# Patient Record
Sex: MALE | Race: BLACK OR AFRICAN AMERICAN | NOT HISPANIC OR LATINO | Employment: FULL TIME | URBAN - METROPOLITAN AREA
[De-identification: names, ages, dates, MRNs, and addresses within clinical notes are randomized per-mention and may not be internally consistent; named-entity substitution may affect disease eponyms.]

---

## 2018-07-24 ENCOUNTER — PRE VISIT (OUTPATIENT)
Dept: UROLOGY | Facility: CLINIC | Age: 43
End: 2018-07-24

## 2018-07-24 NOTE — TELEPHONE ENCOUNTER
MEDICAL RECORDS REQUEST   [facility]  Urology Clinic  [address] [place] [address]  PHONE: [phone]  Fax: [phone]        FUTURE VISIT INFORMATION                                                   Mildred Quintana : 1975 scheduled for future visit at [facility]    APPOINTMENT INFORMATION:    Date: 2018    Provider:  John cr    Reason for Visit/Diagnosis: Penile sensitivity    REFERRAL INFORMATION:    Referring provider:  self    Specialty: self    Referring providers clinic:  self    Clinic contact number:  self    RECORDS REQUESTED FOR VISIT                                                     NOTES  STATUS/DETAILS   OFFICE NOTE from referring provider  no   OFFICE NOTE from other specialist  no   DISCHARGE SUMMARY from hospital  no   DISCHARGE REPORT from the ER  no   OPERATIVE REPORT  no   MEDICATION LIST  no       PRE-VISIT CHECKLIST      Record collection complete If no, please explain in process   Appointment appropriately scheduled           (right time/right provider) Yes   MyChart activation If no, please explain in process   Questionnaire complete If no, please explain in process     Completed by: Shannon Jones

## 2018-08-16 ENCOUNTER — PRE VISIT (OUTPATIENT)
Dept: UROLOGY | Facility: CLINIC | Age: 43
End: 2018-08-16

## 2018-08-16 NOTE — TELEPHONE ENCOUNTER
Patient with history of penile sensitivity coming in for consult with Dr. Braxton. Patient chart reviewed, no need for call, all records available and ready for appointment.

## 2018-08-23 ENCOUNTER — OFFICE VISIT (OUTPATIENT)
Dept: UROLOGY | Facility: CLINIC | Age: 43
End: 2018-08-23
Payer: COMMERCIAL

## 2018-08-23 ENCOUNTER — APPOINTMENT (OUTPATIENT)
Dept: LAB | Facility: CLINIC | Age: 43
End: 2018-08-23
Payer: COMMERCIAL

## 2018-08-23 ENCOUNTER — RADIANT APPOINTMENT (OUTPATIENT)
Dept: ULTRASOUND IMAGING | Facility: CLINIC | Age: 43
End: 2018-08-23
Attending: UROLOGY
Payer: COMMERCIAL

## 2018-08-23 VITALS — DIASTOLIC BLOOD PRESSURE: 80 MMHG | HEART RATE: 73 BPM | SYSTOLIC BLOOD PRESSURE: 126 MMHG

## 2018-08-23 DIAGNOSIS — N48.6 PEYRONIE'S DISEASE: Primary | ICD-10-CM

## 2018-08-23 DIAGNOSIS — N48.89 PENILE PAIN: ICD-10-CM

## 2018-08-23 DIAGNOSIS — R10.9 FLANK PAIN: ICD-10-CM

## 2018-08-23 LAB
ALBUMIN UR-MCNC: NEGATIVE MG/DL
APPEARANCE UR: CLEAR
BILIRUB UR QL STRIP: NEGATIVE
COLOR UR AUTO: YELLOW
GLUCOSE UR STRIP-MCNC: NEGATIVE MG/DL
HGB UR QL STRIP: NEGATIVE
KETONES UR STRIP-MCNC: NEGATIVE MG/DL
LEUKOCYTE ESTERASE UR QL STRIP: NEGATIVE
MUCOUS THREADS #/AREA URNS LPF: PRESENT /LPF
NITRATE UR QL: NEGATIVE
PH UR STRIP: 6 PH (ref 5–7)
RBC #/AREA URNS AUTO: <1 /HPF (ref 0–2)
SOURCE: ABNORMAL
SP GR UR STRIP: 1.01 (ref 1–1.03)
UROBILINOGEN UR STRIP-MCNC: 0 MG/DL (ref 0–2)
WBC #/AREA URNS AUTO: <1 /HPF (ref 0–5)

## 2018-08-23 ASSESSMENT — ENCOUNTER SYMPTOMS
DIFFICULTY URINATING: 0
HEMATURIA: 0
DYSURIA: 0
FLANK PAIN: 1

## 2018-08-23 ASSESSMENT — PAIN SCALES - GENERAL: PAINLEVEL: MILD PAIN (2)

## 2018-08-23 NOTE — PROGRESS NOTES
I am seeing Mildred Quintana in consultation for evaluation of penile sensitivity.  New patient, self referred..    HPI:  Mildred Quintana is a 43 year old male with sexual concerns.  Moved to [place] from [place] about 6 months ago.  Met a new GF in January.  Had an episode of vigorous intercourse were he had some discomfort.  He's had some lingering pain, at the time, discomfort resolved, but now noticed some vague discomfort that recurred around May 2018.  He's tried different underwear.      REVIEW OF SYSTEMS:  General: negative  Skin: negative  Eyes: negative  Ears/Nose/Throat: negative  Respiratory: negative  Cardiovascular: negative  Gastrointestinal: negative  Genitourinary: see HPI  Musculoskeletal: he's noted a little back ache at time if not hydrating well.  No history of kidney stones.  Neurologic: negative  Psychiatric: negative  Hematologic/Lymphatic/Immunologic: negative  Endocrine: negative    PAST MEDICAL HX:  No chronic medical problems     PAST SURG HX:  No history of surgery.    FAMILY HX:  No family history on file.    SOCIAL HX:  Social History   Substance Use Topics     Smoking status: Not on file     Smokeless tobacco: Not on file     Alcohol use Not on file       MEDICATIONS:  No prescription medications     ALLERGIES:  Review of patient's allergies indicates not on file.    GENERAL PHYSICAL EXAM:   /80  Pulse 73   Constitutional: No acute distress. Well nourished.   PSYCH: normal mood and affect.  NEURO: normal gait, no focal deficits.   EYES: anicteric, EOMI, PERR.  CARDIOPULMONARY: breathing non-labored, pulse regular rate/rhythm, no peripheral edema.  GI: Abdomen soft, non-tender, no surgical scars, no organomegaly.  MUSCULOSKELETAL: normal limb proportions, no muscle wasting, no contractures.  SKIN: Normal virilized hair distribution, no lesions, warts or rashes over genitalia, abdomen extremities or face.  HEME/LYMPH: no ecchymosis, no lymphadenopathy in groin or neck, no  lymphedema.     EXAM:  Phallus  circumcised, meatus adequate, diffuse distal plaques palpated at the distal tips  Left testis descended , size is normal  , consistency is normal . No intra-testicular masses.   Right testis descended , size is normal  , consistency is normal . No intra-testicular masses.   Epididymes present, non-tender, not-enlarged.   Left cord: Vas present.   Right cord: Vas present.    Prostate exam: 30g, anodular, nontender.    Imaging/labs:  none    ASSESSMENT:     Penile discomfort- unclear etiology.  May be sequela of mild Peyronie's disease.  Pain is on his right which is opposite his left lateral curvature ( which is not new).        Occasionally has right flank discomfort 3/10 when not hydrated well.    PLAN:    LAURE today was  normal     UAIC ordered    Observe mild Peyronie's disease which does not sound active.    Renal ultrasound to screen for renal hydronephrosis given flank pain at times.      John Braxton MD     Urological Surgeon

## 2018-08-23 NOTE — LETTER
8/23/2018       RE: Mildred Quintana  [address]  [facility] [address]     Dear Colleague,    Thank you for referring your patient, Mildred Quintana, to the [facility] UROLOGY AND INST FOR PROSTATE AND UROLOGIC CANCERS at [facility]. Please see a copy of my visit note below.    I am seeing Mildred Quintana in consultation for evaluation of penile sensitivity.  New patient, self referred..    HPI:  Mildred Quintana is a 43 year old male with sexual concerns.  Moved to [place] from [place] about 6 months ago.  Met a new GF in January.  Had an episode of vigorous intercourse were he had some discomfort.  He's had some lingering pain, at the time, discomfort resolved, but now noticed some vague discomfort that recurred around May 2018.  He's tried different underwear.    PAST MEDICAL HX:  No chronic medical problems     PAST SURG HX:  No history of surgery.    FAMILY HX:  No family history on file.    SOCIAL HX:  Social History   Substance Use Topics     Smoking status: Not on file     Smokeless tobacco: Not on file     Alcohol use Not on file       MEDICATIONS:  No prescription medications     ALLERGIES:  Review of patient's allergies indicates not on file.    GENERAL PHYSICAL EXAM:   /80  Pulse 73   Constitutional: No acute distress. Well nourished.   PSYCH: normal mood and affect.  NEURO: normal gait, no focal deficits.   EYES: anicteric, EOMI, PERR.  CARDIOPULMONARY: breathing non-labored, pulse regular rate/rhythm, no peripheral edema.  GI: Abdomen soft, non-tender, no surgical scars, no organomegaly.  MUSCULOSKELETAL: normal limb proportions, no muscle wasting, no contractures.  SKIN: Normal virilized hair distribution, no lesions, warts or rashes over genitalia, abdomen extremities or face.  HEME/LYMPH: no ecchymosis, no lymphadenopathy in groin or neck, no lymphedema.     EXAM:  Phallus  circumcised, meatus adequate, diffuse distal plaques palpated at the distal  tips  Left testis descended , size is normal  , consistency is normal . No intra-testicular masses.   Right testis descended , size is normal  , consistency is normal . No intra-testicular masses.   Epididymes present, non-tender, not-enlarged.   Left cord: Vas present.   Right cord: Vas present.    Prostate exam: 30g, anodular, nontender.    Imaging/labs:  none    ASSESSMENT:     Penile discomfort- unclear etiology.  May be sequela of mild Peyronie's disease.  Pain is on his right which is opposite his left lateral curvature ( which is not new).        Occasionally has right flank discomfort 3/10 when not hydrated well.    PLAN:    LAURE today was  normal     UAIC ordered    Observe mild Peyronie's disease which does not sound active.    Renal ultrasound to screen for renal hydronephrosis given flank pain at times.      John Braxton MD     Urological Surgeon

## 2018-08-23 NOTE — PROGRESS NOTES
Dear Mildred,   Here are your recent results.     Kidneys are normal.  No concerns.  Thank You      John Braxton MD

## 2018-08-23 NOTE — PATIENT INSTRUCTIONS
Schedule a lab appointment for a urine test and a renal US.    It was a pleasure meeting with you today.  Thank you for allowing me and my team the privilege of caring for you today.  YOU are the reason we are here, and I truly hope we provided you with the excellent service you deserve.  Please let us know if there is anything else we can do for you so that we can be sure you are leaving completely satisfied with your care experience.

## 2018-08-23 NOTE — MR AVS SNAPSHOT
After Visit Summary   8/23/2018    Mildred Quintana    MRN: [id number]           Patient Information     Date Of Birth          1975        Visit Information        Provider Department      8/23/2018 8:20 AM John Braxton MD [facility] Urology and [facility]rologic C[facility]        Today's Diagnoses     Peyronie's disease    -  1    Penile pain        Flank pain          Care Instructions    Schedule a lab appointment for a urine test and a renal US.    It was a pleasure meeting with you today.  Thank you for allowing me and my team the privilege of caring for you today.  YOU are the reason we are here, and I truly hope we provided you with the excellent service you deserve.  Please let us know if there is anything else we can do for you so that we can be sure you are leaving completely satisfied with your care experience.                Follow-ups after your visit        Your next 10 appointments already scheduled     Aug 23, 2018  9:30 AM CDT   LAB with [facility] ([facility])    [address] [place] [id number] [phone]           Please do not eat 10-12 hours before your appointment if you are coming in fasting for labs on lipids, cholesterol, or glucose (sugar). This does not apply to pregnant women. Water, hot tea and black coffee (with nothing added) are okay. Do not drink other fluids, diet soda or chew gum.            Sep 01, 2018  8:00 AM CDT   US RENAL COMPLETE with UCUS3   [facility] US ([facility])    [address] [place] [id number] [phone]           Please bring a list of your medicines (including vitamins, minerals and over-the-counter drugs). Also, tell your doctor about any allergies you may have. Wear comfortable clothes and leave your valuables at home.  You do not need to do anything special to prepare for your exam.  Please  call the Imaging Department at your exam site with any questions.              Future tests that were ordered for you today     Open Future Orders        Priority Expected Expires Ordered    US Renal Complete Routine  8/23/2019 8/23/2018            Who to contact     Please call your clinic at [phone] to:    Ask questions about your health    Make or cancel appointments    Discuss your medicines    Learn about your test results    Speak to your doctor            Additional Information About Your Visit        [employer] Information     [employer] gives you secure access to your electronic health record. If you see a primary care provider, you can also send messages to your care team and make appointments. If you have questions, please call your primary care clinic.  If you do not have a primary care provider, please call [phone] and they will assist you.      [employer] is an electronic gateway that provides easy, online access to your medical records. With [employer], you can request a clinic appointment, read your test results, renew a prescription or communicate with your care team.     To access your existing account, please contact your [facility] or call [phone] for assistance.        Care EveryWhere ID     This is your Care EveryWhere ID. This could be used by other organizations to access your [place] medical records  [id number]        Your Vitals Were     Pulse                   73            Blood Pressure from Last 3 Encounters:   08/23/18 126/80    Weight from Last 3 Encounters:   No data found for Wt              We Performed the Following     UA with Microscopic reflex to Culture        Primary Care Provider Fax #    Physician No Ref-Primary [phone]       No address on file        Equal Access to Services     HARMAN PÉREZ : Adeline Davey, kirill lal, brittany nuñez. So [facility]  [phone].    ATENCIÓN: Si habla neyda, tiene a villagomez disposición servicios gratuitos de asistencia lingüística. Llbassam al [phone].    We comply with applicable federal civil rights laws and [place] laws. We do not discriminate on the basis of race, color, national origin, age, disability, sex, sexual orientation, or gender identity.            Thank you!     Thank you for choosing [facility] UROLOGY AND [facility] FOR PROSTATE AND UROLOGIC CANCERS  for your care. Our goal is always to provide you with excellent care. Hearing back from our patients is one way we can continue to improve our services. Please take a few minutes to complete the written survey that you may receive in the mail after your visit with us. Thank you!             Your Updated Medication List - Protect others around you: Learn how to safely use, store and throw away your medicines at www.disposemymeds.org.      Notice  As of 8/23/2018  9:27 AM    You have not been prescribed any medications.

## 2018-11-27 ENCOUNTER — PRE VISIT (OUTPATIENT)
Dept: UROLOGY | Facility: CLINIC | Age: 43
End: 2018-11-27

## 2018-11-27 NOTE — TELEPHONE ENCOUNTER
Patient with history of penile lump coming in for consult with Dr. Braxton. Patient chart reviewed, no need for call, all records available and ready for appointment.

## 2018-12-06 ENCOUNTER — OFFICE VISIT (OUTPATIENT)
Dept: UROLOGY | Facility: CLINIC | Age: 43
End: 2018-12-06
Payer: COMMERCIAL

## 2018-12-06 VITALS — SYSTOLIC BLOOD PRESSURE: 118 MMHG | DIASTOLIC BLOOD PRESSURE: 75 MMHG | HEIGHT: 69 IN | HEART RATE: 65 BPM

## 2018-12-06 DIAGNOSIS — N48.9 PENILE LESION: Primary | ICD-10-CM

## 2018-12-06 ASSESSMENT — PAIN SCALES - GENERAL: PAINLEVEL: NO PAIN (0)

## 2018-12-06 NOTE — PROGRESS NOTES
"Urology Clinic Note      Date: 12/6/2018  Time: 9:54 AM  Patient: Mildred Quintana  MRN: [id number]    Reason for Visit: Penile lesion    HPI/Subjective: Mildred Quintana is a 43 year old male who presents for evaluation of a penile nodule    He was last seen in 08/2018 for evaluation of penile pain after vigorous intercourse. This was thought to be likely due to Peyronie's as there were distal plaques on exam. UA and NICOLA were obtained as he was endorsing intermittent flank pain, both were normal.     Pt noting a soft penile nodule on penis shaft- he wanted to get checked out. - this just looks like a slight irregularity in his circumcision line.    Scrotal ultrasound showed BPH.  Pt has minimal lower urinary tract symptoms, but we want to check a PVR to make sure he voids to completion.    Objective:  /75  Pulse 65  Ht 1.753 m (5' 9\")  Gen: In NAD, conversant.  Resp: Breathing non-labored  CV: Extremities warm.  Abd: Soft, non-distended, non-tender.  Phallus circumcised  Testes 18-20cc, no varicoceles noted today.    Assessment & Plan: Mildred Quintana is a 43 year old male for evaluation of a penile nodule.     PVR today = 11cc  Normal looking penile skin  Has BPH but advised observation.    15min visit, over 50% face to face in counseling/discussion of penile lesion and lower urinary tract symptoms.    I saw and examined the patient with the resident today.  I agree with the resident note and plan of care as above.     John Braxton MD  Urology Staff   "

## 2018-12-06 NOTE — MR AVS SNAPSHOT
"              After Visit Summary   12/6/2018    Mildred Quintana    MRN: [id number]           Patient Information     Date Of Birth          1975        Visit Information        Provider Department      12/6/2018 10:00 AM John Braxton MD [facility] Urology and [facility] for Prostate and Urologic Cancers        Today's Diagnoses     Penile lesion    -  1       Follow-ups after your visit        Follow-up notes from your care team     Return if symptoms worsen or fail to improve.      Who to contact     Please call your clinic at [phone] to:    Ask questions about your health    Make or cancel appointments    Discuss your medicines    Learn about your test results    Speak to your doctor            Additional Information About Your Visit        [facility] Information     [employer] gives you secure access to your electronic health record. If you see a primary care provider, you can also send messages to your care team and make appointments. If you have questions, please call your primary care clinic.  If you do not have a primary care provider, please call [phone] and they will assist you.      [employer] is an electronic gateway that provides easy, online access to your medical records. With [employer], you can request a clinic appointment, read your test results, renew a prescription or communicate with your care team.     To access your existing account, please contact your [facility] or call [phone] for assistance.        Care EveryWhere ID     This is your Care EveryWhere ID. This could be used by other organizations to access your [place] medical records  [id number]        Your Vitals Were     Pulse Height                65 1.753 m (5' 9\")           Blood Pressure from Last 3 Encounters:   12/06/18 118/75   08/23/18 126/80    Weight from Last 3 Encounters:   No data found for Wt              We Performed the Following     POST-VOID RESIDUAL BLADDER SCAN        Primary " Care Provider Fax #    Physician No Ref-Primary [phone]       No address on file        Equal Access to Services     SESARHOLLEY ELISA : Hadii aad ku hadestrella Davey, kirill nealpromise, matt cuellar, brittany mikkiin hayaapadmaja elier mena labrendapadmaja johnson. So [facility] [phone].    ATENCIÓN: Si habla español, tiene a villagomez disposición servicios gratuitos de asistencia lingüística. Llame al [phone].    We comply with applicable federal civil rights laws and [place] laws. We do not discriminate on the basis of race, color, national origin, age, disability, sex, sexual orientation, or gender identity.            Thank you!     Thank you for choosing [facility] UROLOGY AND [facility] FOR PROSTATE AND UROLOGIC CANCERS  for your care. Our goal is always to provide you with excellent care. Hearing back from our patients is one way we can continue to improve our services. Please take a few minutes to complete the written survey that you may receive in the mail after your visit with us. Thank you!             Your Updated Medication List - Protect others around you: Learn how to safely use, store and throw away your medicines at www.disposemymeds.org.      Notice  As of 12/6/2018  1:50 PM    You have not been prescribed any medications.

## 2018-12-06 NOTE — NURSING NOTE
"Chief Complaint   Patient presents with     Consult     history of penile lump coming in for consult with Dr. Braxton       Blood pressure 118/75, pulse 65, height 1.753 m (5' 9\"). There is no height or weight on file to calculate BMI.    There is no problem list on file for this patient.      No Known Allergies    No current outpatient prescriptions on file.       Social History   Substance Use Topics     Smoking status: Former Smoker     Smokeless tobacco: Never Used     Alcohol use Not on file       Olena Mackay LPN  12/6/2018  10:48 AM    "

## 2018-12-06 NOTE — LETTER
"12/6/2018       RE: Mildred Quintana  [address]  [facility] [address]     Dear Colleague,    Thank you for referring your patient, Mildred Quintana, to the [facility] UROLOGY AND INST FOR PROSTATE AND UROLOGIC CANCERS at [facility]. Please see a copy of my visit note below.    Urology Clinic Note      Date: 12/6/2018  Time: 9:54 AM  Patient: Mildred Quintana  MRN: [id number]    Reason for Visit: Penile lesion    HPI/Subjective: Mildred Quintana is a 43 year old male who presents for evaluation of a penile nodule    He was last seen in 08/2018 for evaluation of penile pain after vigorous intercourse. This was thought to be likely due to Peyronie's as there were distal plaques on exam. UA and NICOLA were obtained as he was endorsing intermittent flank pain, both were normal.     Pt noting a soft penile nodule on penis shaft- he wanted to get checked out. - this just looks like a slight irregularity in his circumcision line.    Scrotal ultrasound showed BPH.  Pt has minimal lower urinary tract symptoms, but we want to check a PVR to make sure he voids to completion.    Objective:  /75  Pulse 65  Ht 1.753 m (5' 9\")  Gen: In NAD, conversant.  Resp: Breathing non-labored  CV: Extremities warm.  Abd: Soft, non-distended, non-tender.  Phallus circumcised  Testes 18-20cc, no varicoceles noted today.    Assessment & Plan: Mildred Quintana is a 43 year old male for evaluation of a penile nodule.     PVR today = 11cc  Normal looking penile skin  Has BPH but advised observation.    15min visit, over 50% face to face in counseling/discussion of penile lesion and lower urinary tract symptoms.    I saw and examined the patient with the resident today.  I agree with the resident note and plan of care as above.     John Braxton MD  Urology Staff         "

## 2020-03-10 ENCOUNTER — HEALTH MAINTENANCE LETTER (OUTPATIENT)
Age: 45
End: 2020-03-10

## 2020-12-27 ENCOUNTER — HEALTH MAINTENANCE LETTER (OUTPATIENT)
Age: 45
End: 2020-12-27

## 2021-04-24 ENCOUNTER — HEALTH MAINTENANCE LETTER (OUTPATIENT)
Age: 46
End: 2021-04-24

## 2021-10-09 ENCOUNTER — HEALTH MAINTENANCE LETTER (OUTPATIENT)
Age: 46
End: 2021-10-09

## 2022-05-21 ENCOUNTER — HEALTH MAINTENANCE LETTER (OUTPATIENT)
Age: 47
End: 2022-05-21

## 2022-09-11 ENCOUNTER — HEALTH MAINTENANCE LETTER (OUTPATIENT)
Age: 47
End: 2022-09-11

## 2022-09-21 ENCOUNTER — MEDICAL CORRESPONDENCE (OUTPATIENT)
Dept: HEALTH INFORMATION MANAGEMENT | Facility: CLINIC | Age: 47
End: 2022-09-21

## 2022-09-27 ENCOUNTER — LAB (OUTPATIENT)
Dept: LAB | Facility: CLINIC | Age: 47
End: 2022-09-27
Payer: COMMERCIAL

## 2022-09-27 DIAGNOSIS — M84.374A STRESS FRACTURE OF RIGHT FOOT: ICD-10-CM

## 2022-09-27 DIAGNOSIS — Q66.52 CONGENITAL PES PLANUS OF LEFT FOOT: ICD-10-CM

## 2022-09-27 DIAGNOSIS — M25.371 UNSTABLE RIGHT ANKLE: Primary | ICD-10-CM

## 2022-09-27 DIAGNOSIS — Q66.51 CONGENITAL PES PLANUS OF RIGHT FOOT: ICD-10-CM

## 2022-09-27 PROCEDURE — 36415 COLL VENOUS BLD VENIPUNCTURE: CPT

## 2022-09-27 PROCEDURE — 82306 VITAMIN D 25 HYDROXY: CPT

## 2022-10-03 LAB
DEPRECATED CALCIDIOL+CALCIFEROL SERPL-MC: <56 UG/L (ref 20–75)
VITAMIN D2 SERPL-MCNC: <5 UG/L
VITAMIN D3 SERPL-MCNC: 51 UG/L

## 2022-11-17 ENCOUNTER — OFFICE VISIT (OUTPATIENT)
Dept: FAMILY MEDICINE | Facility: CLINIC | Age: 47
End: 2022-11-17
Payer: COMMERCIAL

## 2022-11-17 VITALS
HEART RATE: 78 BPM | DIASTOLIC BLOOD PRESSURE: 71 MMHG | HEIGHT: 69 IN | SYSTOLIC BLOOD PRESSURE: 118 MMHG | WEIGHT: 198 LBS | BODY MASS INDEX: 29.33 KG/M2 | OXYGEN SATURATION: 98 % | RESPIRATION RATE: 20 BRPM | TEMPERATURE: 96.8 F

## 2022-11-17 DIAGNOSIS — R73.03 PREDIABETES: ICD-10-CM

## 2022-11-17 DIAGNOSIS — Z01.818 PREOP GENERAL PHYSICAL EXAM: ICD-10-CM

## 2022-11-17 DIAGNOSIS — S83.281A ACUTE LATERAL MENISCUS TEAR OF RIGHT KNEE, INITIAL ENCOUNTER: Primary | ICD-10-CM

## 2022-11-17 LAB
ANION GAP SERPL CALCULATED.3IONS-SCNC: 10 MMOL/L (ref 7–15)
BUN SERPL-MCNC: 16.7 MG/DL (ref 6–20)
CALCIUM SERPL-MCNC: 9.8 MG/DL (ref 8.6–10)
CHLORIDE SERPL-SCNC: 100 MMOL/L (ref 98–107)
CREAT SERPL-MCNC: 1.06 MG/DL (ref 0.67–1.17)
DEPRECATED HCO3 PLAS-SCNC: 26 MMOL/L (ref 22–29)
GFR SERPL CREATININE-BSD FRML MDRD: 87 ML/MIN/1.73M2
GLUCOSE SERPL-MCNC: 114 MG/DL (ref 70–99)
HBA1C MFR BLD: 5.4 % (ref 0–5.6)
HGB BLD-MCNC: 14.1 G/DL (ref 13.3–17.7)
POTASSIUM SERPL-SCNC: 4.4 MMOL/L (ref 3.4–5.3)
SODIUM SERPL-SCNC: 136 MMOL/L (ref 136–145)

## 2022-11-17 PROCEDURE — 83036 HEMOGLOBIN GLYCOSYLATED A1C: CPT | Performed by: PHYSICIAN ASSISTANT

## 2022-11-17 PROCEDURE — 36415 COLL VENOUS BLD VENIPUNCTURE: CPT | Performed by: PHYSICIAN ASSISTANT

## 2022-11-17 PROCEDURE — 85018 HEMOGLOBIN: CPT | Performed by: PHYSICIAN ASSISTANT

## 2022-11-17 PROCEDURE — 80048 BASIC METABOLIC PNL TOTAL CA: CPT | Performed by: PHYSICIAN ASSISTANT

## 2022-11-17 PROCEDURE — 99204 OFFICE O/P NEW MOD 45 MIN: CPT | Performed by: PHYSICIAN ASSISTANT

## 2022-11-17 RX ORDER — BLOOD SUGAR DIAGNOSTIC
STRIP MISCELLANEOUS
COMMUNITY
Start: 2022-05-07 | End: 2023-04-14

## 2022-11-17 RX ORDER — LANCETS 33 GAUGE
EACH MISCELLANEOUS
COMMUNITY
Start: 2022-06-23 | End: 2023-04-14

## 2022-11-17 ASSESSMENT — PAIN SCALES - GENERAL: PAINLEVEL: NO PAIN (0)

## 2022-11-17 NOTE — PROGRESS NOTES
[address], [address]  [facility] [address]  Phone: [phone]  Primary Provider: No Ref-Primary, Physician  Pre-op Performing Provider: DALTON SHOEMAKER PA-C      PREOPERATIVE EVALUATION:  Today's date: 11/17/2022    Mildred Quintana is a 47 year old male who presents for a preoperative evaluation.    Surgical Information:  Surgery/Procedure: Right Knee arthroscopy   Surgery Location: [facility]  Surgeon:   Surgery Date: 12/12/2022  Time of Surgery: 11:30 AM  Where patient plans to recover: At home with family  Fax number for surgical facility: [phone]    Type of Anesthesia Anticipated: to be determined    Assessment & Plan     The proposed surgical procedure is considered LOW risk.    Acute lateral meniscus tear of right knee, initial encounter      Preop general physical exam  - pt denies any use of asa or nsaids.  - there are no medications that he will need to take the day of surgery  - pt will take a home covid test 1-2 days prior to surgery unless told otherwise by surgeon             Risks and Recommendations:  The patient has the following additional risks and recommendations for perioperative complications:   - No identified additional risk factors other than previously addressed    Medication Instructions:  Patient is on no chronic medications   Pt only taking metformin prn     RECOMMENDATION:  APPROVAL GIVEN to proceed with proposed procedure, without further diagnostic evaluation.      Subjective     HPI related to upcoming procedure: R knee pain off and on for years, but worse after injury in basketball one year ago.       Preop Questions 11/17/2022   1. Have you ever had a heart attack or stroke? No   2. Have you ever had surgery on your heart or blood vessels, such as a stent placement, a coronary artery bypass, or surgery on an artery in your head, neck, heart, or legs? No   3. Do you have chest pain with activity? No   4. Do you have  a history of  heart failure? No   5. Do you currently have a cold, bronchitis or symptoms of other infection? No   6. Do you have a cough, shortness of breath, or wheezing? No   7. Do you or anyone in your family have previous history of blood clots? No   8. Do you or does anyone in your family have a serious bleeding problem such as prolonged bleeding following surgeries or cuts? No   9. Have you ever had problems with anemia or been told to take iron pills? No   10. Have you had any abnormal blood loss such as black, tarry or bloody stools? No   11. Have you ever had a blood transfusion? No   12. Are you willing to have a blood transfusion if it is medically needed before, during, or after your surgery? No    13. Have you or any of your relatives ever had problems with anesthesia? No   14. Do you have sleep apnea, excessive snoring or daytime drowsiness? No   15. Do you have any artifical heart valves or other implanted medical devices like a pacemaker, defibrillator, or continuous glucose monitor? No   16. Do you have artificial joints? No   17. Are you allergic to latex? No     Status of Chronic Conditions:  Pt has a history of prediabetes and last year told no longer needs to take metformin, but he does still take it occasionally if he has a large meal or if the blood sugar is high (>120).  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 Last night was 98.  Current hemoglobin A1c in the non diabetic range at 5.4%.    Review of Systems  CONSTITUTIONAL: NEGATIVE for fever, chills, change in weight  INTEGUMENTARY/SKIN: NEGATIVE for worrisome rashes, moles or lesions  EYES: NEGATIVE for vision changes or irritation  ENT/MOUTH: NEGATIVE for ear, mouth and throat problems  RESP: NEGATIVE for significant cough or SOB  CV: NEGATIVE for chest pain, palpitations or peripheral edema  GI: NEGATIVE for nausea, abdominal pain, heartburn, or change in bowel habits  : NEGATIVE for frequency, dysuria, or  "hematuria  MUSCULOSKELETAL: NEGATIVE for significant arthralgias or myalgia  NEURO: NEGATIVE for weakness, dizziness or paresthesias  ENDOCRINE: NEGATIVE for temperature intolerance, skin/hair changes  HEME: NEGATIVE for bleeding problems  PSYCHIATRIC: NEGATIVE for changes in mood or affect    Past Medical History:   Diagnosis Date     Prediabetes      Past Surgical History:   Procedure Laterality Date     NO HISTORY OF SURGERY       Current Outpatient Medications   Medication Sig Dispense Refill     Lancets (ONETOUCH DELICA PLUS [id number]) MISC USE TO TEST DAILY.       metFORMIN (GLUCOPHAGE) 500 MG tablet Take 500 mg by mouth daily as needed       ONETOUCH ULTRA test strip use to test blood sugars once daily         Allergies   Allergen Reactions     No Known Allergies         Social History     Tobacco Use     Smoking status: Former     Smokeless tobacco: Never   Substance Use Topics     Alcohol use: Not Currently     Family History   Problem Relation Age of Onset     Diabetes Mother      Hypertension Father      History   Drug Use Unknown         Objective     /71 (BP Location: Right arm, Patient Position: Sitting, Cuff Size: Adult Large)   Pulse 78   Temp 96.8  F (36  C) (Temporal)   Resp 20   Ht 1.753 m (5' 9\")   Wt 89.8 kg (198 lb)   SpO2 98%   BMI 29.24 kg/m      Physical Exam    GENERAL APPEARANCE: healthy, alert and no distress     EYES: EOMI,  PERRL     HENT: ear canals and TM's normal and nose and mouth without ulcers or lesions     NECK: no adenopathy, no asymmetry, masses, or scars and thyroid normal to palpation     RESP: lungs clear to auscultation - no rales, rhonchi or wheezes     CV: regular rates and rhythm, normal S1 S2, no S3 or S4 and no murmur, click or rub     ABDOMEN:  soft, nontender, no HSM or masses and bowel sounds normal     MS: extremities normal- no gross deformities noted, no evidence of inflammation in joints, FROM in all extremities.     SKIN: no suspicious lesions or " rashes     NEURO: Normal strength and tone, sensory exam grossly normal, mentation intact and speech normal     PSYCH: mentation appears normal. and affect normal/bright     LYMPHATICS: No cervical adenopathy    Diagnostics:  Results for orders placed or performed in visit on 11/17/22   Hemoglobin A1c     Status: Normal   Result Value Ref Range    Hemoglobin A1C 5.4 0.0 - 5.6 %   Hemoglobin     Status: Normal   Result Value Ref Range    Hemoglobin 14.1 13.3 - 17.7 g/dL          No EKG required for low risk surgery (cataract, skin procedure, breast biopsy, etc).    Revised Cardiac Risk Index (RCRI):  The patient has the following serious cardiovascular risks for perioperative complications:   - No serious cardiac risks = 0 points     RCRI Interpretation: 0 points: Class I (very low risk - 0.4% complication rate)           Signed Electronically by: Courtney Gusman PA-C  Copy of this evaluation report is provided to requesting physician.

## 2022-11-17 NOTE — RESULT ENCOUNTER NOTE
Adarryl,    Your blood sugar was 114 but your A1c was in the nondiabetic range at 5.4% so that looks good.  Your hemoglobin is normal.     I hope your surgery goes well.    Courtney Gusman PA-C

## 2023-01-13 ENCOUNTER — TRANSFERRED RECORDS (OUTPATIENT)
Dept: HEALTH INFORMATION MANAGEMENT | Facility: CLINIC | Age: 48
End: 2023-01-13
Payer: COMMERCIAL

## 2023-01-26 ENCOUNTER — OFFICE VISIT (OUTPATIENT)
Dept: ANESTHESIOLOGY | Facility: CLINIC | Age: 48
End: 2023-01-26
Payer: COMMERCIAL

## 2023-01-26 ENCOUNTER — DOCUMENTATION ONLY (OUTPATIENT)
Dept: ANESTHESIOLOGY | Facility: CLINIC | Age: 48
End: 2023-01-26

## 2023-01-26 VITALS
SYSTOLIC BLOOD PRESSURE: 127 MMHG | DIASTOLIC BLOOD PRESSURE: 85 MMHG | OXYGEN SATURATION: 97 % | BODY MASS INDEX: 29.33 KG/M2 | WEIGHT: 198 LBS | HEIGHT: 69 IN | HEART RATE: 76 BPM

## 2023-01-26 DIAGNOSIS — M47.812 CERVICAL SPONDYLOSIS: ICD-10-CM

## 2023-01-26 DIAGNOSIS — M48.02 NEUROFORAMINAL STENOSIS OF CERVICAL SPINE: ICD-10-CM

## 2023-01-26 DIAGNOSIS — M54.12 CERVICAL RADICULOPATHY: Primary | ICD-10-CM

## 2023-01-26 PROCEDURE — 99204 OFFICE O/P NEW MOD 45 MIN: CPT | Performed by: ANESTHESIOLOGY

## 2023-01-26 RX ORDER — MELOXICAM 15 MG/1
15 TABLET ORAL DAILY
COMMUNITY

## 2023-01-26 ASSESSMENT — ENCOUNTER SYMPTOMS
NECK PAIN: 1
NERVOUS/ANXIOUS: 1
HEADACHES: 1
GASTROINTESTINAL NEGATIVE: 1
SENSORY CHANGE: 1
MYALGIAS: 1
CONSTITUTIONAL NEGATIVE: 1
DEPRESSION: 1
BACK PAIN: 1
TINGLING: 1
EYES NEGATIVE: 1
RESPIRATORY NEGATIVE: 1
CARDIOVASCULAR NEGATIVE: 1

## 2023-01-26 ASSESSMENT — PAIN SCALES - GENERAL: PAINLEVEL: MODERATE PAIN (5)

## 2023-01-26 NOTE — NURSING NOTE
RN reviewed AVS with patient. Patient to contact clinic if any questions/concerns. Patient verbalized understanding.    Malina Tam RNCC

## 2023-01-26 NOTE — LETTER
Date:February 10, 2023      Provider requested that no letter be sent. Do not send.       [facility]

## 2023-01-26 NOTE — LETTER
1/26/2023       RE: Mildred Quintana  [address]  [facility] [address]     Dear Colleague,    Thank you for referring your patient, Mildred Quintana, to the [facility] FOR COMPREHENSIVE PAIN MANAGEMENT [place] [facility]. Please see a copy of my visit note below.    [facility] for Comprehensive Chronic Pain Management : Consultation Note    Patient: Mildred Quintana Age: 47 year old   MRN: [id number] Referred by:  No ref. provider found     Date of Visit: January 26, 2023    Reason for consultation:    Mildred Quintana is a 47 year old male who is seen in consultation today at the request of his provider, No ref. provider found for a comprehensive evaluation and management of pain.  Primary Care Provider is No Ref-Primary, Physician.      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  He had chronic knee pain for which she had left arthroscopic meniscectomy.  His neck pain symptoms have been going on for a long time but it got worse in the last few months.  He had an MRI at the [facility] which showed lumbar spinal stenosis.  Although report is not available, patient is not interested in interventional  options.  He states that he is worried about the needle.  He had steroid injection of the wrist which was painful and did not give him  much pain relief.  In addition to his neck pain, he also reports subjective right thumb wasting of his muscles..  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    [place] Prescription Monitoring Program:   Reviewed. No concerns    Review of Systems:  Review of Systems   Constitutional: Negative.    HENT: Negative.    Eyes: Negative.    Respiratory: Negative.    Cardiovascular: Negative.    Gastrointestinal: Negative.    Genitourinary: Negative.    Musculoskeletal: Positive for back pain, joint pain, myalgias and neck pain.   Skin: Negative.    Neurological: Positive for tingling, sensory change and headaches.   Endo/Heme/Allergies: Negative.    Psychiatric/Behavioral: Positive for depression. The patient is nervous/anxious.            No flowsheet data found.     PHQ-2 ( 1999 [employer]) 1/26/2023 11/17/2022   Q1: Little interest or pleasure in doing things 0 0   Q2: Feeling down, depressed or hopeless 1 0   PHQ-2 Score 1 0   Q1: Little interest or pleasure in doing things - Not at all   Q2: Feeling down, depressed or hopeless - Not at all   PHQ-2 Score - 0        No flowsheet data found.       Past Medical History:  Past Medical History:   Diagnosis Date     Prediabetes        Past Surgical History:  Past Surgical History:   Procedure Laterality Date     NO HISTORY OF SURGERY         Medications:  Current Outpatient Medications   Medication Sig Dispense Refill     meloxicam (MOBIC) 15 MG tablet Take 15 mg by mouth daily       metFORMIN (GLUCOPHAGE) 500 MG tablet Take 500 mg by mouth daily as needed       ONETOUCH ULTRA test strip use to test blood sugars once daily       Lancets (ONETOUCH DELICA PLUS [id number]) MISC USE TO TEST DAILY. (Patient not taking: Reported on 1/26/2023)           Medications related to Pain Management:   Medications related to Pain Management (From now, onward)    None          Allergies:       Allergies   Allergen Reactions     No Known Allergies   "      Social History:    Social History     Socioeconomic History     Marital status:      Spouse name: Not on file     Number of children: Not on file     Years of education: Not on file     Highest education level: Not on file   Occupational History     Not on file   Tobacco Use     Smoking status: Former     Smokeless tobacco: Never   Vaping Use     Vaping Use: Never used   Substance and Sexual Activity     Alcohol use: Not Currently     Drug use: Never     Sexual activity: Yes     Partners: Female   Other Topics Concern     Parent/sibling w/ CABG, MI or angioplasty before 65F 55M? Not Asked   Social History Narrative    Pharmacist     Social Determinants of Health     Financial Resource Strain: Not on file   Food Insecurity: Not on file   Transportation Needs: Not on file   Physical Activity: Not on file   Stress: Not on file   Social Connections: Not on file   Intimate Partner Violence: Not on file   Housing Stability: Not on file     Social History     Social History Narrative    Pharmacist         Family history:  Family History   Problem Relation Age of Onset     Diabetes Mother      Hypertension Father          Physical Exam:  Vitals:    01/26/23 0903   BP: 127/85   BP Location: Right arm   Patient Position: Chair   Cuff Size: Adult Large   Pulse: 76   SpO2: 97%   Weight: 89.8 kg (198 lb)   Height: 1.74 m (5' 8.5\")       General: Awake in no apparent distress.   Eyes: Sclerae are anicteric. PERRLA, EOMI   Neck: supple, no masses.   Lungs: unlabored.   Heart: regular rate and rhythm   Abdomen: soft non tender.  Extremities: Pulses are well palpable, no peripheral edema.   Musculoskeletal: All muscle groups are normal in bulk and tone. The patient changes position without pain behavior. The patient walks with a normal gait. Posture is normal.  Spurling negative.  [id number] neck movement against gravity did not produce pain.  Minimal tenderness noted on the cervical paraspinal muscles and facet " joints.  The range of motion of the lumbar spine is normal  in all directions. The spinous processes in the cervical, thoracic, and lumbar spine are midline, with mild tenderness over the lumbar paraspinous muscles and facet joints.There was  tenderness to palpation noted over the sacroiliac joints . Erasto s test was negative.   Neurologic exam: Sensation to light touch intact throughout all dermatomes bilateral upper extremities and lower extremities  Psychiatric; Normal affect.   Skin: Warm and Dry.       LABORATORY VALUES:   Recent Labs   Lab Test 11/17/22  1039      POTASSIUM 4.4   CHLORIDE 100   CO2 26   ANIONGAP 10   *   BUN 16.7   CR 1.06   MIROSLAVA 9.8       CBC RESULTS:   Recent Labs   Lab Test 11/17/22  1039   HGB 14.1       Most Recent 3 INR's:No lab results found.          ASSESSMENT/PLAN:                             ASSESSMENT:    Diagnoses       Codes Comments    Cervical radiculopathy    -  Primary M54.12     Cervical spondylosis     M47.812     Neuroforaminal stenosis of cervical spine     M48.02         PLAN:    - Medications.     Advised him to take gabapentin 300 mg 3 times daily.  No medication dispensed today       - Interventional procedures:    Recommended cervical epidural steroid injection.  Risk of the procedure including bleeding, infection, failed procedure, nerve injury and paralysis discussed with him.  However he is reluctant to perform epidural steroid injection because of his past experience with the wrist injection which was not helpful.    - Labs and imaging: May consider nerve conduction study for right thumb muscle wasting in the future.    - Rehab: Referred to physical therapy for increasing the core muscle strength     - Psychology: No current needs.        - Disposition: Follow-up in 6 months or earlier if needed    Assessment will be ongoing with changes in treatment as indicated.  Benefits/risks/alternatives to treatment have been reviewed and the patient has been  instructed to contact this office if they have any questions or concerns.  This plan of care has been discussed with the patient and the patient is in agreement.     Nevaeh Holm MD, PHD            Again, thank you for allowing me to participate in the care of your patient.      Sincerely,    Nevaeh Holm MD

## 2023-01-26 NOTE — PATIENT INSTRUCTIONS
Medications:    Gabapentin 10 mg by mouth three times daily    Please provide the clinic with a minium of 1 week notice, on all prescription refills.       Referrals:    Physical Therapy Referral placed-   If you have not heard from the scheduling office within 2 business days, please call [phone] for all locations       Treatment planning:    KAREN from [facility] - MRI cervical spine      Recommended Follow up:      Follow up in 1 month - telephone visit OK.      To speak with a nurse, schedule/reschedule/cancel a clinic appointment, or request a medication refill call: [phone].    You can also reach us by [employer]: [email or web address]

## 2023-01-26 NOTE — NURSING NOTE
Patient presents with:  Consult: Consult left Neck ,back pain      Moderate Pain (5)         What medications are you using for pain? Meloxicam    (New patients only) Have you been seen by another pain clinic/ provider? yes    (Return Patients only) What refills are you needing today? No    Expectation want second opinion

## 2023-01-26 NOTE — PROGRESS NOTES
[facility] for Comprehensive Chronic Pain Management : Consultation Note    Patient: Mildred Quintana Age: 47 year old   MRN: [id number] Referred by:  No ref. provider found     Date of Visit: January 26, 2023    Reason for consultation:    Mildred Quintana is a 47 year old male who is seen in consultation today at the request of his provider,Dr. Arce ref. provider found for a comprehensive evaluation and management of pain.  Primary Care Provider is No Ref-Primary, Physician.      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  He had chronic knee pain for which she had left arthroscopic meniscectomy.  His neck pain symptoms have been going on for a long time but it got worse in the last few months.  He had an MRI at the [facility] which showed lumbar spinal stenosis.  Although report is not available, patient is not interested in interventional  options.  He states that he is worried about the needle.  He had steroid injection of the wrist which was painful and did not give him much pain relief.  In addition to his neck pain, he also reports subjective right thumb wasting of his muscles..  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    [place] Prescription Monitoring Program:   Reviewed. No concerns    Review of Systems:  Review of Systems   Constitutional: Negative.     HENT: Negative.    Eyes: Negative.    Respiratory: Negative.    Cardiovascular: Negative.    Gastrointestinal: Negative.    Genitourinary: Negative.    Musculoskeletal: Positive for back pain, joint pain, myalgias and neck pain.   Skin: Negative.    Neurological: Positive for tingling, sensory change and headaches.   Endo/Heme/Allergies: Negative.    Psychiatric/Behavioral: Positive for depression. The patient is nervous/anxious.            No flowsheet data found.     PHQ-2 ( 1999 Pfizer) 1/26/2023 11/17/2022   Q1: Little interest or pleasure in doing things 0 0   Q2: Feeling down, depressed or hopeless 1 0   PHQ-2 Score 1 0   Q1: Little interest or pleasure in doing things - Not at all   Q2: Feeling down, depressed or hopeless - Not at all   PHQ-2 Score - 0        No flowsheet data found.       Past Medical History:  Past Medical History:   Diagnosis Date     Prediabetes        Past Surgical History:  Past Surgical History:   Procedure Laterality Date     NO HISTORY OF SURGERY         Medications:  Current Outpatient Medications   Medication Sig Dispense Refill     meloxicam (MOBIC) 15 MG tablet Take 15 mg by mouth daily       metFORMIN (GLUCOPHAGE) 500 MG tablet Take 500 mg by mouth daily as needed       ONETOUCH ULTRA test strip use to test blood sugars once daily       Lancets (ONETOUCH DELICA PLUS [id number]) MISC USE TO TEST DAILY. (Patient not taking: Reported on 1/26/2023)           Medications related to Pain Management:   Medications related to Pain Management (From now, onward)    None          Allergies:       Allergies   Allergen Reactions     No Known Allergies        Social History:    Social History     Socioeconomic History     Marital status:      Spouse name: Not on file     Number of children: Not on file     Years of education: Not on file     Highest education level: Not on file   Occupational History     Not on file   Tobacco Use     Smoking status: Former     Smokeless tobacco:  "Never   Vaping Use     Vaping Use: Never used   Substance and Sexual Activity     Alcohol use: Not Currently     Drug use: Never     Sexual activity: Yes     Partners: Female   Other Topics Concern     Parent/sibling w/ CABG, MI or angioplasty before 65F 55M? Not Asked   Social History Narrative    Pharmacist     Social Determinants of Health     Financial Resource Strain: Not on file   Food Insecurity: Not on file   Transportation Needs: Not on file   Physical Activity: Not on file   Stress: Not on file   Social Connections: Not on file   Intimate Partner Violence: Not on file   Housing Stability: Not on file     Social History     Social History Narrative    Pharmacist         Family history:  Family History   Problem Relation Age of Onset     Diabetes Mother      Hypertension Father          Physical Exam:  Vitals:    01/26/23 0903   BP: 127/85   BP Location: Right arm   Patient Position: Chair   Cuff Size: Adult Large   Pulse: 76   SpO2: 97%   Weight: 89.8 kg (198 lb)   Height: 1.74 m (5' 8.5\")       General: Awake in no apparent distress.   Eyes: Sclerae are anicteric. PERRLA, EOMI   Neck: supple, no masses.   Lungs: unlabored.   Heart: regular rate and rhythm   Abdomen: soft non tender.  Extremities: Pulses are well palpable, no peripheral edema.   Musculoskeletal: All muscle groups are normal in bulk and tone. The patient changes position without pain behavior. The patient walks with a normal gait. Posture is normal.  Spurling negative.  Aknu-gd-eoii neck movement against gravity did not produce pain.  Minimal tenderness noted on the cervical paraspinal muscles and facet joints.  The range of motion of the lumbar spine is normal  in all directions. The spinous processes in the cervical, thoracic, and lumbar spine are midline, with mild tenderness over the lumbar paraspinous muscles and facet joints.There was  tenderness to palpation noted over the sacroiliac joints . Erasto s test was negative.   Neurologic " exam: Sensation to light touch intact throughout all dermatomes bilateral upper extremities and lower extremities  Psychiatric; Normal affect.   Skin: Warm and Dry.       LABORATORY VALUES:   Recent Labs   Lab Test 11/17/22  1039      POTASSIUM 4.4   CHLORIDE 100   CO2 26   ANIONGAP 10   *   BUN 16.7   CR 1.06   MIROSLAVA 9.8       CBC RESULTS:   Recent Labs   Lab Test 11/17/22  1039   HGB 14.1       Most Recent 3 INR's:No lab results found.          ASSESSMENT/PLAN:                             ASSESSMENT:    Diagnoses       Codes Comments    Cervical radiculopathy    -  Primary M54.12     Cervical spondylosis     M47.812     Neuroforaminal stenosis of cervical spine     M48.02         PLAN:    - Medications.     Advised him to take gabapentin 300 mg 3 times daily.  No medication dispensed today       - Interventional procedures:    Recommended cervical epidural steroid injection.  Risk of the procedure including bleeding, infection, failed procedure, nerve injury and paralysis discussed with him.  However he is reluctant to perform epidural steroid injection because of his past experience with the wrist injection which was not helpful.    - Labs and imaging: May consider nerve conduction study for right thumb muscle wasting in the future.    - Rehab: Referred to physical therapy for increasing the core muscle strength     - Psychology: No current needs.        - Disposition: Follow-up in 6 months or earlier if needed    Assessment will be ongoing with changes in treatment as indicated.  Benefits/risks/alternatives to treatment have been reviewed and the patient has been instructed to contact this office if they have any questions or concerns.  This plan of care has been discussed with the patient and the patient is in agreement.     Nevaeh Holm MD, PHD

## 2023-04-14 ENCOUNTER — TELEPHONE (OUTPATIENT)
Dept: INTERNAL MEDICINE | Facility: CLINIC | Age: 48
End: 2023-04-14

## 2023-04-14 ENCOUNTER — VIRTUAL VISIT (OUTPATIENT)
Dept: INTERNAL MEDICINE | Facility: CLINIC | Age: 48
End: 2023-04-14
Payer: COMMERCIAL

## 2023-04-14 DIAGNOSIS — F43.22 ADJUSTMENT DISORDER WITH ANXIOUS MOOD: ICD-10-CM

## 2023-04-14 DIAGNOSIS — G95.9 CERVICAL MYELOPATHY (H): Primary | ICD-10-CM

## 2023-04-14 DIAGNOSIS — E04.1 THYROID NODULE: ICD-10-CM

## 2023-04-14 PROCEDURE — 99214 OFFICE O/P EST MOD 30 MIN: CPT | Mod: VID | Performed by: INTERNAL MEDICINE

## 2023-04-14 RX ORDER — TIZANIDINE HYDROCHLORIDE 2 MG/1
CAPSULE, GELATIN COATED ORAL
COMMUNITY
Start: 2023-03-29 | End: 2023-04-25

## 2023-04-14 RX ORDER — METHOCARBAMOL 500 MG/1
500 TABLET, FILM COATED ORAL 2 TIMES DAILY PRN
Qty: 30 TABLET | Refills: 1 | Status: SHIPPED | OUTPATIENT
Start: 2023-04-14 | End: 2023-05-25

## 2023-04-14 NOTE — PROGRESS NOTES
Mildred is a 47 year old who is being evaluated via a billable video visit.      How would you like to obtain your AVS? MyChart  If the video visit is dropped, the invitation should be resent by: Text to cell phone: [phone]  Will anyone else be joining your video visit? No    Assessment & Plan   Cervical myelopathy (H)  Encouraged him to continue following with relevant specialists. He has not found benefit from tizanidine and cyclobenzaprine makes him too sleepy. Will trial methocarbamol, though cautioned that he may experience sedation with this medication as well.  - methocarbamol (ROBAXIN) 500 MG tablet; Take 1 tablet (500 mg) by mouth 2 times daily as needed for muscle spasms    Thyroid nodule  Seen on MRI per report. Next step U/S thyroid which he was in agreement with. Order placed.  - US Thyroid; Future    Adjustment disorder with anxious mood  Referral to mental health placed per request.  - Adult Mental Health  Referral; Future    F/u with regular PCP at regular interval or sooner MONICA Yan MD  [facility]    Subjective   Mildred is a 47 year old who presents for a same day acute care virtual video visit with a number of concerns. This is the first time I have met Mildred, who typically gets care at clinics closer to his residence. He is requesting a thyroid lab check. He reports muscle wasting in his arms. He tells me he's getting cervical spine care at [facility] and they are talking about doing surgery. Meloxicam helps with pain. He notes twitching in his arms which he feels is related. He tells me he then went to a pain clinic in [place] who also recommended he consider surgery. The pain clinic prescribed him tizanidine. He did not feel it helped. He's been to a chiropractor which he did not find helpful. He would like a referral to mental health to help process these health issues.    Review of Systems   Constitutional, MSK, psych systems are negative,  except as otherwise noted.      Objective    Vitals - Patient Reported  Pain Score: Moderate Pain (5)  Pain Loc:  (muscle pain)  Vitals: No vitals were obtained today due to virtual visit.    Physical Exam   GENERAL: Healthy, alert and no distress  EYES: Eyes grossly normal to inspection.  No discharge or erythema, or obvious scleral/conjunctival abnormalities.  RESP: No audible wheeze, cough, or visible cyanosis.  No visible retractions or increased work of breathing.    SKIN: Visible skin clear. No significant rash, abnormal pigmentation or lesions.  NEURO: Cranial nerves grossly intact.  Mentation and speech appropriate for age.  PSYCH: Mentation appears normal, affect normal/bright, judgement and insight intact, normal speech and appearance well-groomed.    Video-Visit Details  Type of service: Video Visit   Originating Location (pt. Location): Home  Distant Location (provider location):  On-site  Platform used for Video Visit: [employer]

## 2023-04-14 NOTE — TELEPHONE ENCOUNTER
Spoke to patient to relay providers message.     Patient understands, expressed some confusion he's having regarding establishing care with a provider and how he can do this. Writer explained the need for establishing care with a provider of patient's choice, and assisted in scheduling that appointment. Patient is appreciative and plans to discuss all his medical concerns at his upcoming visit to establish care/physical.     Future Appointments 4/14/2023 - 10/11/2023      Date Visit Type Length Department Provider     4/25/2023  3:00 PM ANNUAL WELLNESS 30 min CS [facility]/Edvin Stacy PA-C    Location Instructions:     [facility] is in [address] of the [facility] at [address] This is just south of [facility] and the [facility] exit off of [address]. Free parking is available; access the lot by turning east from [facility] onto [address]. Through the main entrance, the clinic is directly to the left.

## 2023-04-14 NOTE — TELEPHONE ENCOUNTER
Patient had virtual visit today. States that he has concerns regarding anxiety. States that he feels tense, anxious and overwhelmed. Preoccupation with his health and it's making it hard to concentrate.     Patient states that he has been on medication in the past for anxiety.    Patient would like a prescription for lorazepam 1 mg. Has been on it in the past. Open to trying maintenance medication. .    Patient has referral for therapist.    Can we leave a detailed message on this number? YES  Phone number patient can be reached at: Cell number on file:    Telephone Information:   Mobile [phone]       Lizette Walters RN  [facility] Triage

## 2023-04-14 NOTE — TELEPHONE ENCOUNTER
This issue was not discussed at today's visit. I do not recommend benzodiazepine therapy nor would I prescribe it. Patient should make another appointment with any provider (though preferably with the provider he plans to follow with long-term near his place of residence) to discuss other medication options. (Triage - please note that we cannot bill for another visit today)

## 2023-04-24 ENCOUNTER — TELEPHONE (OUTPATIENT)
Dept: BEHAVIORAL HEALTH | Facility: CLINIC | Age: 48
End: 2023-04-24

## 2023-04-24 NOTE — TELEPHONE ENCOUNTER
Pt is a(n) adult (18+ out of HS) Seeking as eval for Adult Mental Health DA for evaluation and recommendations.   Appointment scheduled by: Pt  Contact information verified/updated: Yes

## 2023-04-25 ENCOUNTER — ANCILLARY PROCEDURE (OUTPATIENT)
Dept: ULTRASOUND IMAGING | Facility: CLINIC | Age: 48
End: 2023-04-25
Attending: INTERNAL MEDICINE
Payer: COMMERCIAL

## 2023-04-25 ENCOUNTER — OFFICE VISIT (OUTPATIENT)
Dept: FAMILY MEDICINE | Facility: CLINIC | Age: 48
End: 2023-04-25
Payer: COMMERCIAL

## 2023-04-25 VITALS
WEIGHT: 201 LBS | HEIGHT: 69 IN | DIASTOLIC BLOOD PRESSURE: 78 MMHG | BODY MASS INDEX: 29.77 KG/M2 | OXYGEN SATURATION: 95 % | HEART RATE: 90 BPM | TEMPERATURE: 98.9 F | RESPIRATION RATE: 18 BRPM | SYSTOLIC BLOOD PRESSURE: 125 MMHG

## 2023-04-25 DIAGNOSIS — E04.1 THYROID NODULE: ICD-10-CM

## 2023-04-25 DIAGNOSIS — H61.23 BILATERAL IMPACTED CERUMEN: ICD-10-CM

## 2023-04-25 DIAGNOSIS — Z12.11 SCREEN FOR COLON CANCER: ICD-10-CM

## 2023-04-25 DIAGNOSIS — G95.9 CERVICAL MYELOPATHY (H): ICD-10-CM

## 2023-04-25 DIAGNOSIS — R73.03 PREDIABETES: ICD-10-CM

## 2023-04-25 DIAGNOSIS — Z00.00 ANNUAL PHYSICAL EXAM: Primary | ICD-10-CM

## 2023-04-25 LAB
BASOPHILS # BLD AUTO: 0 10E3/UL (ref 0–0.2)
BASOPHILS NFR BLD AUTO: 1 %
EOSINOPHIL # BLD AUTO: 0.1 10E3/UL (ref 0–0.7)
EOSINOPHIL NFR BLD AUTO: 2 %
ERYTHROCYTE [DISTWIDTH] IN BLOOD BY AUTOMATED COUNT: 11.3 % (ref 10–15)
HBA1C MFR BLD: 5.5 % (ref 0–5.6)
HCT VFR BLD AUTO: 45 % (ref 40–53)
HGB BLD-MCNC: 14.8 G/DL (ref 13.3–17.7)
IMM GRANULOCYTES # BLD: 0 10E3/UL
IMM GRANULOCYTES NFR BLD: 0 %
LYMPHOCYTES # BLD AUTO: 1.9 10E3/UL (ref 0.8–5.3)
LYMPHOCYTES NFR BLD AUTO: 29 %
MCH RBC QN AUTO: 29.5 PG (ref 26.5–33)
MCHC RBC AUTO-ENTMCNC: 32.9 G/DL (ref 31.5–36.5)
MCV RBC AUTO: 90 FL (ref 78–100)
MONOCYTES # BLD AUTO: 0.6 10E3/UL (ref 0–1.3)
MONOCYTES NFR BLD AUTO: 9 %
NEUTROPHILS # BLD AUTO: 3.9 10E3/UL (ref 1.6–8.3)
NEUTROPHILS NFR BLD AUTO: 60 %
PLATELET # BLD AUTO: 234 10E3/UL (ref 150–450)
RBC # BLD AUTO: 5.02 10E6/UL (ref 4.4–5.9)
WBC # BLD AUTO: 6.5 10E3/UL (ref 4–11)

## 2023-04-25 PROCEDURE — 69209 REMOVE IMPACTED EAR WAX UNI: CPT | Mod: 50 | Performed by: PHYSICIAN ASSISTANT

## 2023-04-25 PROCEDURE — 99213 OFFICE O/P EST LOW 20 MIN: CPT | Mod: 25 | Performed by: PHYSICIAN ASSISTANT

## 2023-04-25 PROCEDURE — 36415 COLL VENOUS BLD VENIPUNCTURE: CPT | Performed by: PHYSICIAN ASSISTANT

## 2023-04-25 PROCEDURE — 80061 LIPID PANEL: CPT | Performed by: PHYSICIAN ASSISTANT

## 2023-04-25 PROCEDURE — 76536 US EXAM OF HEAD AND NECK: CPT | Mod: GC | Performed by: RADIOLOGY

## 2023-04-25 PROCEDURE — 83036 HEMOGLOBIN GLYCOSYLATED A1C: CPT | Performed by: PHYSICIAN ASSISTANT

## 2023-04-25 PROCEDURE — 80050 GENERAL HEALTH PANEL: CPT | Performed by: PHYSICIAN ASSISTANT

## 2023-04-25 PROCEDURE — 99396 PREV VISIT EST AGE 40-64: CPT | Performed by: PHYSICIAN ASSISTANT

## 2023-04-25 RX ORDER — GABAPENTIN 100 MG/1
100 CAPSULE ORAL 3 TIMES DAILY
COMMUNITY
Start: 2023-04-16 | End: 2024-04-05

## 2023-04-25 ASSESSMENT — ENCOUNTER SYMPTOMS
DIARRHEA: 0
NAUSEA: 0
SHORTNESS OF BREATH: 0
CONSTIPATION: 0
CHILLS: 0
EYE PAIN: 0
FREQUENCY: 0
HEMATURIA: 0
ABDOMINAL PAIN: 0
PARESTHESIAS: 0
MYALGIAS: 1
DIZZINESS: 0
FEVER: 0
WEAKNESS: 0
DYSURIA: 0
SORE THROAT: 0
JOINT SWELLING: 0
HEMATOCHEZIA: 0
HEADACHES: 1
ARTHRALGIAS: 1
COUGH: 0
PALPITATIONS: 0
HEARTBURN: 0
NERVOUS/ANXIOUS: 1

## 2023-04-25 NOTE — PROGRESS NOTES
SUBJECTIVE:   CC: Mildred is an 47 year old who presents for preventative health visit.     Here today for an annual physical exam.  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  Previous patient of [facility] physicians.  Originally from [place], trained in [place].  Exercises at the gym.    Ongoing evaluation with pain management and spine for history of right-sided cervical myelopathy.  He is contemplating surgical options which is causing him some stress/anxiety.  Having a hard time mentally grasping the concept of having surgery and taking medications associated with pain as a person in healthcare. Concerned about the stigma associated with this.  Fortunately, is seeing a mental health specialist tomorrow.     Currently taking gabapentin which he is titrating up, Robaxin, and meloxicam.    Recent MRI revealed already known thyroid nodules per patient.  Did have thyroid ultrasound completed today awaiting results.    Due for labs.  History of prediabetes in the past.  Due for colonoscopy.      Patient has been advised of split billing requirements and indicates understanding: Yes  Healthy Habits:     Getting at least 3 servings of Calcium per day:  NO    Bi-annual eye exam:  Yes    Dental care twice a year:  NO    Sleep apnea or symptoms of sleep apnea:  None    Diet:  Regular (no restrictions)    Frequency of exercise:  1 day/week    Duration of exercise:  Less than 15 minutes    Taking medications regularly:  Yes    Medication side effects:  None    PHQ-2 Total Score: 2    Additional concerns today:  No      Today's PHQ-2 Score:       4/25/2023     2:57 PM   PHQ-2 ( 1999 Pfizer)   Q1: Little interest or pleasure in doing things 1   Q2: Feeling down, depressed or hopeless 1   PHQ-2 Score 2   Q1: Little interest or pleasure in doing things Several days   Q2: Feeling down, depressed or hopeless Several days   PHQ-2 Score 2       Social History     Tobacco Use     Smoking status: Former     Smokeless tobacco: Never    Vaping Use     Vaping status: Never Used   Substance Use Topics     Alcohol use: Not Currently         Reviewed orders with patient. Reviewed health maintenance and updated orders accordingly - Yes  Lab work is in process  Labs reviewed in EPIC  BP Readings from Last 3 Encounters:   04/25/23 125/78   01/26/23 127/85   11/17/22 118/71    Wt Readings from Last 3 Encounters:   04/25/23 91.2 kg (201 lb)   01/26/23 89.8 kg (198 lb)   11/17/22 89.8 kg (198 lb)            Patient Active Problem List   Diagnosis     Cervical myelopathy (H)     Thyroid nodule     Past Surgical History:   Procedure Laterality Date     NO HISTORY OF SURGERY         Social History     Tobacco Use     Smoking status: Former     Smokeless tobacco: Never   Vaping Use     Vaping status: Never Used   Substance Use Topics     Alcohol use: Not Currently     Family History   Problem Relation Age of Onset     Diabetes Mother      Hypertension Father          Current Outpatient Medications   Medication Sig Dispense Refill     gabapentin (NEURONTIN) 100 MG capsule Take 100 mg by mouth 3 times daily       meloxicam (MOBIC) 15 MG tablet Take 15 mg by mouth daily       methocarbamol (ROBAXIN) 500 MG tablet Take 1 tablet (500 mg) by mouth 2 times daily as needed for muscle spasms 30 tablet 1     Allergies   Allergen Reactions     No Known Allergies      Recent Labs   Lab Test 04/25/23  1652 11/17/22  1039   A1C 5.5 5.4   CR  --  1.06   GFRESTIMATED  --  87   POTASSIUM  --  4.4        Reviewed and updated as needed this visit by clinical staff   Tobacco  Allergies  Meds   Med Hx  Surg Hx  Fam Hx          Reviewed and updated as needed this visit by Provider   Tobacco  Allergies  Meds   Med Hx  Surg Hx  Fam Hx         Past Medical History:   Diagnosis Date     Prediabetes       Past Surgical History:   Procedure Laterality Date     NO HISTORY OF SURGERY       CONSTITUTIONAL: NEGATIVE for fever, chills, change in weight  INTEGUMENTARY/SKIN:  "NEGATIVE for worrisome rashes, moles or lesions  EYES: NEGATIVE for vision changes or irritation  ENT: NEGATIVE for ear, mouth and throat problems  RESP: NEGATIVE for significant cough or SOB  CV: NEGATIVE for chest pain, palpitations or peripheral edema  GI: NEGATIVE for nausea, abdominal pain, heartburn, or change in bowel habits   male: negative for dysuria, hematuria, decreased urinary stream, erectile dysfunction, urethral discharge  MUSCULOSKELETAL: NEGATIVE for significant arthralgias or myalgia  NEURO: NEGATIVE for weakness, dizziness or paresthesias  PSYCHIATRIC: NEGATIVE for changes in mood or affect    OBJECTIVE:   /78   Pulse 90   Temp 98.9  F (37.2  C) (Oral)   Resp 18   Ht 1.746 m (5' 8.75\")   Wt 91.2 kg (201 lb)   SpO2 95%   BMI 29.90 kg/m      Physical Exam  GENERAL: healthy, alert and no distress  EYES: Eyes grossly normal to inspection, PERRL and conjunctivae and sclerae normal  HENT: Cerumen impaction bilaterally.  Otherwise, ear canals and TM's normal, nose and mouth without ulcers or lesions  NECK: no adenopathy, no asymmetry, masses, or scars and thyroid normal to palpation  RESP: lungs clear to auscultation - no rales, rhonchi or wheezes  CV: regular rate and rhythm, normal S1 S2, no S3 or S4, no murmur, click or rub, no peripheral edema  ABDOMEN: soft, nontender, no hepatosplenomegaly, no masses  MS: no gross musculoskeletal defects noted, no edema  SKIN: no suspicious lesions or rashes  NEURO: Normal strength and tone, mentation intact and speech normal  PSYCH: mentation appears normal, affect normal/bright      ASSESSMENT/PLAN:   Mildred was seen today for physical and establish care.    Diagnoses and all orders for this visit:    Annual physical exam    Labs today. Colonoscopy ordered. CPE in 1 year.     -     REVIEW OF HEALTH MAINTENANCE PROTOCOL ORDERS  -     Colonoscopy Screening  Referral; Future  -     Lipid panel reflex to direct LDL Non-fasting; Future  -     " "CBC with platelets and differential; Future  -     Comprehensive metabolic panel (BMP + Alb, Alk Phos, ALT, AST, Total. Bili, TP); Future  -     TSH with free T4 reflex; Future  -     Hemoglobin A1c; Future    Cervical myelopathy  Discussed at length with the patient.  Expressed my ability to help in any way that I can.  He is coping with some of the stress and stigma surrounding this. Happy he is establishing care with mental health providers. Encouraged at this point he discuss breakthrough pain plan with [facility] pain clinic where he is established.     Screen for colon cancer     Discussed colon cancer screening options.  Referral placed for colonoscopy.    -     Colonoscopy Screening  Referral; Future    Prediabetes  Healthy diet and exercise.  Check A1c today.  -     Hemoglobin A1c    Bilateral cerumen impaction  Ear lavage today.  If no resolution, Debrox drops at home and repeat at upcoming visit.    Thyroid nodule  -     US Biopsy Thyroid Fine Needle Aspiration; Future  -     IR Referral; Future    Patient has been advised of split billing requirements and indicates understanding: Yes  Greater than 45 mins spent in direct consultation with the patient plus 15 mins spent on chart review and documentation.     COUNSELING:   Reviewed preventive health counseling, as reflected in patient instructions       Regular exercise       Healthy diet/nutrition       Vision screening       Colorectal cancer screening      BMI:   Estimated body mass index is 29.9 kg/m  as calculated from the following:    Height as of this encounter: 1.746 m (5' 8.75\").    Weight as of this encounter: 91.2 kg (201 lb).   Weight management plan: Discussed healthy diet and exercise guidelines      He reports that he has quit smoking. He has never used smokeless tobacco.            The likelihood of other entities in the differential is insufficient to justify any further testing for them at this time. This was explained to the patient. " The patient was advised that persistent or worsening symptoms would require further evaluation. Patient advised to call the office and if unable to reach to go to the emergency room if they develop any new or worsening symptoms. Expressed understanding and agreement with above stated plan.       SMITA Leyva [facility]  Answers for HPI/ROS submitted by the patient on 4/25/2023  abdominal pain: No  Blood in stool: No  Blood in urine: No  chest pain: No  chills: No  congestion: No  constipation: No  cough: No  diarrhea: No  dizziness: No  ear pain: No  eye pain: No  nervous/anxious: Yes  fever: No  frequency: No  genital sores: No  headaches: Yes  hearing loss: No  heartburn: No  arthralgias: Yes  joint swelling: No  peripheral edema: No  mood changes: Yes  myalgias: Yes  nausea: No  dysuria: No  palpitations: No  Skin sensation changes: No  sore throat: No  urgency: No  rash: No  shortness of breath: No  visual disturbance: No  weakness: No  impotence: No  penile discharge: No

## 2023-04-26 ENCOUNTER — HOSPITAL ENCOUNTER (OUTPATIENT)
Dept: BEHAVIORAL HEALTH | Facility: CLINIC | Age: 48
Discharge: HOME | End: 2023-04-26
Attending: FAMILY MEDICINE | Admitting: FAMILY MEDICINE
Payer: COMMERCIAL

## 2023-04-26 DIAGNOSIS — F43.22 ADJUSTMENT DISORDER WITH ANXIOUS MOOD: ICD-10-CM

## 2023-04-26 LAB
ALBUMIN SERPL BCG-MCNC: 4.7 G/DL (ref 3.5–5.2)
ALP SERPL-CCNC: 61 U/L (ref 40–129)
ALT SERPL W P-5'-P-CCNC: 25 U/L (ref 10–50)
ANION GAP SERPL CALCULATED.3IONS-SCNC: 13 MMOL/L (ref 7–15)
AST SERPL W P-5'-P-CCNC: 26 U/L (ref 10–50)
BILIRUB SERPL-MCNC: 0.5 MG/DL
BUN SERPL-MCNC: 17.1 MG/DL (ref 6–20)
CALCIUM SERPL-MCNC: 10.1 MG/DL (ref 8.6–10)
CHLORIDE SERPL-SCNC: 101 MMOL/L (ref 98–107)
CHOLEST SERPL-MCNC: 215 MG/DL
CREAT SERPL-MCNC: 1.21 MG/DL (ref 0.67–1.17)
DEPRECATED HCO3 PLAS-SCNC: 27 MMOL/L (ref 22–29)
GFR SERPL CREATININE-BSD FRML MDRD: 74 ML/MIN/1.73M2
GLUCOSE SERPL-MCNC: 67 MG/DL (ref 70–99)
HDLC SERPL-MCNC: 61 MG/DL
LDLC SERPL CALC-MCNC: 134 MG/DL
NONHDLC SERPL-MCNC: 154 MG/DL
POTASSIUM SERPL-SCNC: 4.5 MMOL/L (ref 3.4–5.3)
PROT SERPL-MCNC: 7.7 G/DL (ref 6.4–8.3)
SODIUM SERPL-SCNC: 141 MMOL/L (ref 136–145)
TRIGL SERPL-MCNC: 99 MG/DL
TSH SERPL DL<=0.005 MIU/L-ACNC: 0.55 UIU/ML (ref 0.3–4.2)

## 2023-04-26 PROCEDURE — 90791 PSYCH DIAGNOSTIC EVALUATION: CPT | Mod: GT,95 | Performed by: COUNSELOR

## 2023-04-26 ASSESSMENT — COLUMBIA-SUICIDE SEVERITY RATING SCALE - C-SSRS
6. HAVE YOU EVER DONE ANYTHING, STARTED TO DO ANYTHING, OR PREPARED TO DO ANYTHING TO END YOUR LIFE?: NO
TOTAL  NUMBER OF ABORTED OR SELF INTERRUPTED ATTEMPTS LIFETIME: NO
ATTEMPT LIFETIME: NO
1. HAVE YOU WISHED YOU WERE DEAD OR WISHED YOU COULD GO TO SLEEP AND NOT WAKE UP?: NO
2. HAVE YOU ACTUALLY HAD ANY THOUGHTS OF KILLING YOURSELF?: NO
TOTAL  NUMBER OF INTERRUPTED ATTEMPTS LIFETIME: NO

## 2023-04-26 ASSESSMENT — PATIENT HEALTH QUESTIONNAIRE - PHQ9
SUM OF ALL RESPONSES TO PHQ QUESTIONS 1-9: 9
SUM OF ALL RESPONSES TO PHQ QUESTIONS 1-9: 9
10. IF YOU CHECKED OFF ANY PROBLEMS, HOW DIFFICULT HAVE THESE PROBLEMS MADE IT FOR YOU TO DO YOUR WORK, TAKE CARE OF THINGS AT HOME, OR GET ALONG WITH OTHER PEOPLE: SOMEWHAT DIFFICULT

## 2023-04-26 ASSESSMENT — ANXIETY QUESTIONNAIRES
2. NOT BEING ABLE TO STOP OR CONTROL WORRYING: MORE THAN HALF THE DAYS
GAD7 TOTAL SCORE: 8
3. WORRYING TOO MUCH ABOUT DIFFERENT THINGS: SEVERAL DAYS
6. BECOMING EASILY ANNOYED OR IRRITABLE: NOT AT ALL
GAD7 TOTAL SCORE: 8
7. FEELING AFRAID AS IF SOMETHING AWFUL MIGHT HAPPEN: SEVERAL DAYS
GAD7 TOTAL SCORE: 8
8. IF YOU CHECKED OFF ANY PROBLEMS, HOW DIFFICULT HAVE THESE MADE IT FOR YOU TO DO YOUR WORK, TAKE CARE OF THINGS AT HOME, OR GET ALONG WITH OTHER PEOPLE?: SOMEWHAT DIFFICULT
1. FEELING NERVOUS, ANXIOUS, OR ON EDGE: MORE THAN HALF THE DAYS
IF YOU CHECKED OFF ANY PROBLEMS ON THIS QUESTIONNAIRE, HOW DIFFICULT HAVE THESE PROBLEMS MADE IT FOR YOU TO DO YOUR WORK, TAKE CARE OF THINGS AT HOME, OR GET ALONG WITH OTHER PEOPLE: SOMEWHAT DIFFICULT
7. FEELING AFRAID AS IF SOMETHING AWFUL MIGHT HAPPEN: SEVERAL DAYS
5. BEING SO RESTLESS THAT IT IS HARD TO SIT STILL: NOT AT ALL
4. TROUBLE RELAXING: MORE THAN HALF THE DAYS

## 2023-04-26 NOTE — RESULT ENCOUNTER NOTE
Hey Adarryl,     Appears based on your thyroid ultrasound they wish to biospy a few of these nodules further. Placed a referral to IR for you. You should be hearing from them this week. If not, let me know.     Happy to talk further - will keep you posted on your labs.     Edvin Reich PA-C  [facility]
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
## 2023-04-26 NOTE — PATIENT INSTRUCTIONS
Mildred,   It was a pleasure meeting with you today. We got you scheduled with a therapist and psychiatric provider, see appointment details below. I also put a resource below. If you have any questions, any issues, or want more resources please reach out.    PSYCHIATRIC APPOINTMENT SCHEDULED  Date: Friday, 4/28/2023  Time: 10:00 am - 11:00 am  Provider: Devan Mckeon MD, MD  Location: [facility], [address] [place] [address]  Phone: [phone]  Type: Telepsychiatry  Patient Instructions:[facility] is a [place] virtual psychiatric clinic. All appointments are through our HIPPA compliant platform. You will receive an on-boarding email from [facility] to assist you with this process and instructions to help set up your patient profile. Please follow the instructions from the email. If you have any questions, please call our direct number and we will assist you. Our phone number is: [phone].    THERAPY APPOINTMENT SCHEDULED  Date: Friday, 5/5/2023  Time: 10:00 am - 11:00 am  Provider: Adriana Gutiérrez MS  Location: [facility], [facility], [address] [place] [address] [address], [place] [address]  Phone: [phone]  Type: Teletherapy  Patient Instructions:For Telehealth we will need your paperwork before you are seen. Please fill out forms on this website: [email or web address] Email/fax/mail accepted.     RESOURCE  [facility] (free short term therapy)  Website: [email or web address]    Sabra Mckeon LPCC, ALVAROC  Licensed Psychotherapist  [facility]  Mental Health [facility] Addiction Services [facility] [email or web address] [email or web address]  [email or web address]  Office: [phone]  Fax: [phone]  Gender pronouns: she/her/hers  Employed by: [facility]

## 2023-04-27 NOTE — RESULT ENCOUNTER NOTE
Hi Adarryl,     -Your comprehensive metabolic panel which includes tests of liver function (ALT, AST, total bilirubin, alkaline phosphatase), kidney function (creatinine, BUN), electrolytes (sodium, potassium, calcium), and blood sugar (glucose) returned stable for you. Be sure to stay well hydrated as your are taking the meloxicam.     -Blood counts are stable as well as your thyroid function.    -A1c shows no signs of diabetes.     - Lipids are elevated. ASCVD risk score is 4.2%. In the low risk category. Healthy diet and exercise for now.     The 10-year ASCVD risk score (Sary CULLEN, et al., 2019) is: 4.2%    Values used to calculate the score:      Age: 47 years      Sex: Male      Is Non- : Yes      Diabetic: No      Tobacco smoker: No      Systolic Blood Pressure: 125 mmHg      Is BP treated: No      HDL Cholesterol: 61 mg/dL      Total Cholesterol: 215 mg/dL    Cut back on the amount of fat and cholesterol in your meals  Eat more fresh vegetables and fruits  Eat lean proteins such as fish, poultry, beans, and peas  Eat less red meat and processed meats  Use low-fat dairy products  Use vegetable and nut oils in limited amounts  Limit sweets and processed foods like chips, cookies, and baked goods  Limit sugar-sweetened beverages you drink  Limit how often you eat out  Limit alcohol    They should be contacting you soon for the FNA of the thyroid.     Let me know if you have any questions or concerns - can discuss further at your upcoming appt.     SMITA Leyva [facility]

## 2023-05-04 ENCOUNTER — HOSPITAL ENCOUNTER (OUTPATIENT)
Dept: ULTRASOUND IMAGING | Facility: CLINIC | Age: 48
Discharge: HOME | End: 2023-05-04
Attending: PHYSICIAN ASSISTANT | Admitting: PHYSICIAN ASSISTANT
Payer: COMMERCIAL

## 2023-05-04 ENCOUNTER — OFFICE VISIT (OUTPATIENT)
Dept: FAMILY MEDICINE | Facility: CLINIC | Age: 48
End: 2023-05-04
Payer: COMMERCIAL

## 2023-05-04 VITALS
WEIGHT: 199 LBS | TEMPERATURE: 98.4 F | OXYGEN SATURATION: 98 % | BODY MASS INDEX: 29.47 KG/M2 | SYSTOLIC BLOOD PRESSURE: 124 MMHG | HEIGHT: 69 IN | RESPIRATION RATE: 18 BRPM | HEART RATE: 79 BPM | DIASTOLIC BLOOD PRESSURE: 73 MMHG

## 2023-05-04 DIAGNOSIS — E04.1 THYROID NODULE: ICD-10-CM

## 2023-05-04 DIAGNOSIS — E04.1 THYROID NODULE: Primary | ICD-10-CM

## 2023-05-04 DIAGNOSIS — F43.22 ADJUSTMENT DISORDER WITH ANXIOUS MOOD: ICD-10-CM

## 2023-05-04 DIAGNOSIS — R20.2 PARESTHESIA: ICD-10-CM

## 2023-05-04 DIAGNOSIS — R35.0 URINARY FREQUENCY: ICD-10-CM

## 2023-05-04 DIAGNOSIS — E83.52 HYPERCALCEMIA: ICD-10-CM

## 2023-05-04 DIAGNOSIS — G95.9 CERVICAL MYELOPATHY (H): ICD-10-CM

## 2023-05-04 LAB
ALBUMIN UR-MCNC: NEGATIVE MG/DL
APPEARANCE UR: CLEAR
BILIRUB UR QL STRIP: NEGATIVE
COLOR UR AUTO: YELLOW
FOLATE SERPL-MCNC: 23.6 NG/ML (ref 4.6–34.8)
GLUCOSE UR STRIP-MCNC: NEGATIVE MG/DL
HGB UR QL STRIP: NEGATIVE
KETONES UR STRIP-MCNC: NEGATIVE MG/DL
LEUKOCYTE ESTERASE UR QL STRIP: NEGATIVE
NITRATE UR QL: NEGATIVE
PH UR STRIP: 7 [PH] (ref 5–7)
PTH-INTACT SERPL-MCNC: 22 PG/ML (ref 15–65)
SP GR UR STRIP: 1.02 (ref 1–1.03)
UROBILINOGEN UR STRIP-ACNC: 0.2 E.U./DL
VIT B12 SERPL-MCNC: 1584 PG/ML (ref 232–1245)

## 2023-05-04 PROCEDURE — 88173 CYTOPATH EVAL FNA REPORT: CPT | Mod: TC | Performed by: PHYSICIAN ASSISTANT

## 2023-05-04 PROCEDURE — 10006 FNA BX W/US GDN EA ADDL: CPT

## 2023-05-04 PROCEDURE — 99214 OFFICE O/P EST MOD 30 MIN: CPT | Performed by: PHYSICIAN ASSISTANT

## 2023-05-04 PROCEDURE — 88173 CYTOPATH EVAL FNA REPORT: CPT | Mod: 26 | Performed by: PATHOLOGY

## 2023-05-04 PROCEDURE — 81003 URINALYSIS AUTO W/O SCOPE: CPT | Performed by: PHYSICIAN ASSISTANT

## 2023-05-04 PROCEDURE — 83970 ASSAY OF PARATHORMONE: CPT | Performed by: PHYSICIAN ASSISTANT

## 2023-05-04 PROCEDURE — 272N000431 US BIOPSY THYROID FINE NEEDLE ASPIRATION

## 2023-05-04 PROCEDURE — 36415 COLL VENOUS BLD VENIPUNCTURE: CPT | Performed by: PHYSICIAN ASSISTANT

## 2023-05-04 PROCEDURE — 82607 VITAMIN B-12: CPT | Performed by: PHYSICIAN ASSISTANT

## 2023-05-04 PROCEDURE — 82746 ASSAY OF FOLIC ACID SERUM: CPT | Performed by: PHYSICIAN ASSISTANT

## 2023-05-04 PROCEDURE — 250N000009 HC RX 250: Performed by: RADIOLOGY

## 2023-05-04 RX ORDER — TRAMADOL HYDROCHLORIDE 50 MG/1
50 TABLET ORAL EVERY 6 HOURS PRN
Qty: 10 TABLET | Refills: 0 | Status: SHIPPED | OUTPATIENT
Start: 2023-05-04 | End: 2023-05-07

## 2023-05-04 RX ORDER — ESCITALOPRAM OXALATE 10 MG/1
1 TABLET ORAL
COMMUNITY
Start: 2023-04-28 | End: 2024-04-05

## 2023-05-04 RX ORDER — LIDOCAINE HYDROCHLORIDE 10 MG/ML
10 INJECTION, SOLUTION EPIDURAL; INFILTRATION; INTRACAUDAL; PERINEURAL ONCE
Status: COMPLETED | OUTPATIENT
Start: 2023-05-04 | End: 2023-05-04

## 2023-05-04 RX ADMIN — LIDOCAINE HYDROCHLORIDE 6 ML: 10 INJECTION, SOLUTION EPIDURAL; INFILTRATION; INTRACAUDAL; PERINEURAL at 14:06

## 2023-05-04 ASSESSMENT — PAIN SCALES - GENERAL: PAINLEVEL: MODERATE PAIN (5)

## 2023-05-04 NOTE — PROGRESS NOTES
Assessment & Plan     Thyroid nodule  FNA today. Will keep him posted on results.     Cervical myelopathy (H)  Small amount of Tramadol given to be used for breakthrough pain. Reviewed safety.   - traMADol (ULTRAM) 50 MG tablet  Dispense: 10 tablet; Refill: 0    Adjustment disorder with anxious mood  Reviewed lexapro side effects with patient. Not overly common in my opinion. Encouraged close follow-up with mental health specialists.     Paresthesia  Check B12 and Folate  - Vitamin B12  - Folate  - Vitamin B12  - Folate    Hypercalcemia  Low suspicion for parathyroid involvement with his elevated calcium levels.  Check PTH today.  - Parathyroid Hormone Intact    Urinary frequency  Check urinalysis.  Did encourage follow-up with urology if persistent symptoms.  - UA Macroscopic with reflex to Microscopic and Culture      30 minutes spent by me on the date of the encounter doing chart review, review of test results, interpretation of tests, patient visit and documentation          No follow-ups on file.    The likelihood of other entities in the differential is insufficient to justify any further testing for them at this time. This was explained to the patient. The patient was advised that persistent or worsening symptoms would require further evaluation. Patient advised to call the office and if unable to reach to go to the emergency room if they develop any new or worsening symptoms. Expressed understanding and agreement with above stated plan.     SMITA Leyva [facility]    Subjective   Mildred DILLON Mariano is a 47 year old male presenting for the following health issues:  Patient presents with:  Results    Here today for follow-up.    Thyroid FNA today.  Concerned about his parathyroid, wishes to have levels checked given elevated calcium at last visit.    Recently saw a psychiatrist was prescribed Lexapro for adjustment disorder with anxious mood.  Believes this has increased his  "muscle aches.  Does have a follow-up on 5/15/2023 to reevaluate medication.  Seeing a psychologist tomorrow.    Upcoming appointment with neurology for additional discussion about surgical options for his neck.  Using meloxicam, icing, and gabapentin currently.  Does have breakthrough pain on occasion at night and wishes to discuss medication options once again with me.  Ongoing physical therapy at [facility]    Muscle aches and occasionally experiencing paresthesias in his left foot.  Bilateral groin/thigh area.    Ongoing urinary frequency.  Saw urology in 2018.  Wishes to have a urinalysis.  Trying to limit fluids at bedtime.      Review of Systems   Constitutional, HEENT, cardiovascular, pulmonary, GI, , musculoskeletal, neuro, skin, endocrine and psych systems are negative, except as otherwise noted.      Objective    /73 (BP Location: Left arm, Patient Position: Sitting, Cuff Size: Adult Large)   Pulse 79   Temp 98.4  F (36.9  C) (Temporal)   Resp 18   Ht 1.753 m (5' 9\")   Wt 90.3 kg (199 lb)   SpO2 98%   BMI 29.39 kg/m    5' 9\"  199 lbs 0 oz    Allergies   Allergen Reactions     No Known Allergies      Current Outpatient Medications   Medication Sig Dispense Refill     gabapentin (NEURONTIN) 100 MG capsule Take 100 mg by mouth 3 times daily       meloxicam (MOBIC) 15 MG tablet Take 15 mg by mouth daily       methocarbamol (ROBAXIN) 500 MG tablet Take 1 tablet (500 mg) by mouth 2 times daily as needed for muscle spasms 30 tablet 1     traMADol (ULTRAM) 50 MG tablet Take 1 tablet (50 mg) by mouth every 6 hours as needed for severe pain 10 tablet 0     escitalopram (LEXAPRO) 10 MG tablet Take 1 tablet by mouth daily at 2 pm (Patient not taking: Reported on 5/4/2023)       Past Medical History:   Diagnosis Date     Prediabetes      Past Surgical History:   Procedure Laterality Date     NO HISTORY OF SURGERY         Physical Exam   GENERAL: healthy, alert and no distress  EYES: Eyes grossly normal to " inspection, PERRL and conjunctivae and sclerae normal  NECK: no adenopathy, no asymmetry, masses, or scars and thyroid normal to palpation  RESP: lungs clear to auscultation - no rales, rhonchi or wheezes  CV: regular rate and rhythm, normal S1 S2, no S3 or S4, no murmur, click or rub, no peripheral edema  MS: no gross musculoskeletal defects noted, no edema  Full ROM including flexion, extension, and side to side rotation of thoracic and lumbar spine are noted and without elicited discomfort. Sensation to upper and lower extremities is intact bilaterally.  strength normal bilaterally. 5/5 strength to UE and LE.  SKIN: no suspicious lesions or rashes  NEURO: Normal strength and tone, mentation intact and speech normal  PSYCH: mentation appears normal, affect normal/bright

## 2023-05-05 NOTE — RESULT ENCOUNTER NOTE
Hi Adarryl,    Labs look perfect. Stable B12 and folate. Urine was clear. Normal PTH.     I will keep you posted on the FNA.     John

## 2023-05-08 LAB
PATH REPORT.COMMENTS IMP SPEC: NORMAL
PATH REPORT.FINAL DX SPEC: NORMAL
PATH REPORT.GROSS SPEC: NORMAL
PATH REPORT.MICROSCOPIC SPEC OTHER STN: NORMAL

## 2023-05-25 ENCOUNTER — OFFICE VISIT (OUTPATIENT)
Dept: FAMILY MEDICINE | Facility: CLINIC | Age: 48
End: 2023-05-25
Payer: COMMERCIAL

## 2023-05-25 VITALS
RESPIRATION RATE: 18 BRPM | WEIGHT: 202 LBS | HEART RATE: 79 BPM | BODY MASS INDEX: 29.92 KG/M2 | HEIGHT: 69 IN | OXYGEN SATURATION: 98 % | TEMPERATURE: 98.6 F | DIASTOLIC BLOOD PRESSURE: 83 MMHG | SYSTOLIC BLOOD PRESSURE: 125 MMHG

## 2023-05-25 DIAGNOSIS — E04.1 THYROID NODULE: Primary | ICD-10-CM

## 2023-05-25 DIAGNOSIS — G95.9 CERVICAL MYELOPATHY (H): ICD-10-CM

## 2023-05-25 PROCEDURE — 99213 OFFICE O/P EST LOW 20 MIN: CPT | Performed by: PHYSICIAN ASSISTANT

## 2023-05-25 RX ORDER — METHOCARBAMOL 500 MG/1
500 TABLET, FILM COATED ORAL 2 TIMES DAILY PRN
Qty: 30 TABLET | Refills: 1 | Status: SHIPPED | OUTPATIENT
Start: 2023-05-25 | End: 2024-04-05

## 2023-05-25 RX ORDER — TRAMADOL HYDROCHLORIDE 50 MG/1
50 TABLET ORAL EVERY 6 HOURS PRN
Qty: 20 TABLET | Refills: 0 | Status: SHIPPED | OUTPATIENT
Start: 2023-05-25 | End: 2023-05-30

## 2023-05-25 RX ORDER — TRAMADOL HYDROCHLORIDE 50 MG/1
50 TABLET ORAL EVERY 6 HOURS PRN
Qty: 10 TABLET | Refills: 0 | Status: CANCELLED | OUTPATIENT
Start: 2023-05-25 | End: 2023-05-28

## 2023-05-25 ASSESSMENT — PAIN SCALES - GENERAL: PAINLEVEL: NO PAIN (0)

## 2023-05-25 NOTE — PROGRESS NOTES
Assessment & Plan     Thyroid nodule  Well-healing FNA site.  Discussed signs symptoms that warrant urgent/emergent evaluation.  Recommended cold compresses 10 minutes on/10 minutes off a few times a day.  NSAIDs.  Discussed expectations for gradual improvement.    Cervical myelopathy (H)  Medications refilled.  Discussed safety.  Close follow-up with orthopedic spine team.  Upcoming appointment with pain.  Otherwise doing well.  Discussed modifications for work.  - methocarbamol (ROBAXIN) 500 MG tablet  Dispense: 30 tablet; Refill: 1  - traMADol (ULTRAM) 50 MG tablet  Dispense: 20 tablet; Refill: 0      20 minutes spent by me on the date of the encounter doing chart review, review of test results, interpretation of tests, patient visit and documentation          Return in about 2 months (around 7/25/2023) for follow-up per plan.    The likelihood of other entities in the differential is insufficient to justify any further testing for them at this time. This was explained to the patient. The patient was advised that persistent or worsening symptoms would require further evaluation. Patient advised to call the office and if unable to reach to go to the emergency room if they develop any new or worsening symptoms. Expressed understanding and agreement with above stated plan.     SMITA Leyva [facility]    Subjective   Bridgettepablo Quintana is a 47 year old male presenting for the following health issues:  Patient presents with:  Throat Pain    Here today for evaluation following thyroid FNA.  Still reports some tenderness/discomfort on the right side of his thyroid gland.  No difficulty swallowing or voice changes.  Denies fever, chills, or night sweats.  Alternating cold/heat.  Fine cold is more helpful.    Did schedule spine surgery with  upcoming.  Preop on the books.    Requesting refill of his methocarbamol and tramadol.  Tramadol he is using sparingly but does find this to be  "helpful for breakthrough pain.      Review of Systems   Constitutional, HEENT, cardiovascular, pulmonary, GI, , musculoskeletal, neuro, skin, endocrine and psych systems are negative, except as otherwise noted.      Objective    /83 (BP Location: Left arm, Patient Position: Chair, Cuff Size: Adult Large)   Pulse 79   Temp 98.6  F (37  C) (Temporal)   Resp 18   Ht 1.746 m (5' 8.75\")   Wt 91.6 kg (202 lb)   SpO2 98%   BMI 30.05 kg/m    5' 8.75\"  202 lbs 0 oz    Allergies   Allergen Reactions     No Known Allergies      Current Outpatient Medications   Medication Sig Dispense Refill     escitalopram (LEXAPRO) 10 MG tablet Take 1 tablet by mouth daily at 2 pm       gabapentin (NEURONTIN) 100 MG capsule Take 100 mg by mouth 3 times daily       meloxicam (MOBIC) 15 MG tablet Take 15 mg by mouth daily       methocarbamol (ROBAXIN) 500 MG tablet Take 1 tablet (500 mg) by mouth 2 times daily as needed for muscle spasms 30 tablet 1     traMADol (ULTRAM) 50 MG tablet Take 1 tablet (50 mg) by mouth every 6 hours as needed for severe pain 20 tablet 0     Past Medical History:   Diagnosis Date     Prediabetes      Past Surgical History:   Procedure Laterality Date     NO HISTORY OF SURGERY         Physical Exam   GENERAL: healthy, alert and no distress  EYES: Eyes grossly normal to inspection, PERRL and conjunctivae and sclerae normal  NECK: no adenopathy, no asymmetry, masses, or scars and thyroid normal to palpation.  Very minor swelling on the right side of the thyroid compared to left.  No redness or tenderness with palpation.  No wounds.  RESP: lungs clear to auscultation - no rales, rhonchi or wheezes  CV: regular rate and rhythm, normal S1 S2, no S3 or S4, no murmur, click or rub, no peripheral edema  MS: no gross musculoskeletal defects noted, no edema  SKIN: no suspicious lesions or rashes  NEURO: Normal strength and tone, mentation intact and speech normal.  Ambulating appropriately.  PSYCH: mentation " appears normal, affect normal/bright      Answers for HPI/ROS submitted by the patient on 5/25/2023  What is the reason for your visit today? : thyroid  How many servings of fruits and vegetables do you eat daily?: 0-1  On average, how many sweetened beverages do you drink each day (Examples: soda, juice, sweet tea, etc.  Do NOT count diet or artificially sweetened beverages)?: 1  How many minutes a day do you exercise enough to make your heart beat faster?: 9 or less  How many days a week do you exercise enough to make your heart beat faster?: 3 or less  How many days per week do you miss taking your medication?: 0

## 2023-06-22 ENCOUNTER — OFFICE VISIT (OUTPATIENT)
Dept: FAMILY MEDICINE | Facility: CLINIC | Age: 48
End: 2023-06-22
Payer: COMMERCIAL

## 2023-06-22 VITALS
WEIGHT: 199.8 LBS | TEMPERATURE: 99.1 F | HEART RATE: 77 BPM | BODY MASS INDEX: 29.59 KG/M2 | SYSTOLIC BLOOD PRESSURE: 117 MMHG | RESPIRATION RATE: 18 BRPM | OXYGEN SATURATION: 97 % | HEIGHT: 69 IN | DIASTOLIC BLOOD PRESSURE: 79 MMHG

## 2023-06-22 DIAGNOSIS — Z01.818 PREOP GENERAL PHYSICAL EXAM: Primary | ICD-10-CM

## 2023-06-22 DIAGNOSIS — G95.9 CERVICAL MYELOPATHY (H): ICD-10-CM

## 2023-06-22 LAB
ALBUMIN SERPL BCG-MCNC: 4.5 G/DL (ref 3.5–5.2)
ALP SERPL-CCNC: 63 U/L (ref 40–129)
ALT SERPL W P-5'-P-CCNC: 12 U/L (ref 0–70)
ANION GAP SERPL CALCULATED.3IONS-SCNC: 9 MMOL/L (ref 7–15)
AST SERPL W P-5'-P-CCNC: 20 U/L (ref 0–45)
BASOPHILS # BLD AUTO: 0 10E3/UL (ref 0–0.2)
BASOPHILS NFR BLD AUTO: 1 %
BILIRUB SERPL-MCNC: 0.6 MG/DL
BUN SERPL-MCNC: 13.9 MG/DL (ref 6–20)
CALCIUM SERPL-MCNC: 9.8 MG/DL (ref 8.6–10)
CHLORIDE SERPL-SCNC: 101 MMOL/L (ref 98–107)
CREAT SERPL-MCNC: 1.07 MG/DL (ref 0.67–1.17)
DEPRECATED HCO3 PLAS-SCNC: 27 MMOL/L (ref 22–29)
EOSINOPHIL # BLD AUTO: 0.2 10E3/UL (ref 0–0.7)
EOSINOPHIL NFR BLD AUTO: 4 %
ERYTHROCYTE [DISTWIDTH] IN BLOOD BY AUTOMATED COUNT: 11.1 % (ref 10–15)
GFR SERPL CREATININE-BSD FRML MDRD: 86 ML/MIN/1.73M2
GLUCOSE SERPL-MCNC: 157 MG/DL (ref 70–99)
HCT VFR BLD AUTO: 45.9 % (ref 40–53)
HGB BLD-MCNC: 14.7 G/DL (ref 13.3–17.7)
IMM GRANULOCYTES # BLD: 0 10E3/UL
IMM GRANULOCYTES NFR BLD: 0 %
LYMPHOCYTES # BLD AUTO: 1.3 10E3/UL (ref 0.8–5.3)
LYMPHOCYTES NFR BLD AUTO: 30 %
MCH RBC QN AUTO: 28.7 PG (ref 26.5–33)
MCHC RBC AUTO-ENTMCNC: 32 G/DL (ref 31.5–36.5)
MCV RBC AUTO: 90 FL (ref 78–100)
MONOCYTES # BLD AUTO: 0.4 10E3/UL (ref 0–1.3)
MONOCYTES NFR BLD AUTO: 9 %
NEUTROPHILS # BLD AUTO: 2.4 10E3/UL (ref 1.6–8.3)
NEUTROPHILS NFR BLD AUTO: 56 %
PLATELET # BLD AUTO: 247 10E3/UL (ref 150–450)
POTASSIUM SERPL-SCNC: 4.8 MMOL/L (ref 3.4–5.3)
PROT SERPL-MCNC: 7.6 G/DL (ref 6.4–8.3)
RBC # BLD AUTO: 5.13 10E6/UL (ref 4.4–5.9)
SODIUM SERPL-SCNC: 137 MMOL/L (ref 136–145)
WBC # BLD AUTO: 4.3 10E3/UL (ref 4–11)

## 2023-06-22 PROCEDURE — 36415 COLL VENOUS BLD VENIPUNCTURE: CPT | Performed by: PHYSICIAN ASSISTANT

## 2023-06-22 PROCEDURE — 93000 ELECTROCARDIOGRAM COMPLETE: CPT | Performed by: PHYSICIAN ASSISTANT

## 2023-06-22 PROCEDURE — 85025 COMPLETE CBC W/AUTO DIFF WBC: CPT | Performed by: PHYSICIAN ASSISTANT

## 2023-06-22 PROCEDURE — 99214 OFFICE O/P EST MOD 30 MIN: CPT | Performed by: PHYSICIAN ASSISTANT

## 2023-06-22 PROCEDURE — 80053 COMPREHEN METABOLIC PANEL: CPT | Performed by: PHYSICIAN ASSISTANT

## 2023-06-22 RX ORDER — TRAMADOL HYDROCHLORIDE 50 MG/1
50 TABLET ORAL EVERY 6 HOURS PRN
COMMUNITY
Start: 2023-06-09 | End: 2023-06-29

## 2023-06-22 ASSESSMENT — PAIN SCALES - GENERAL: PAINLEVEL: MODERATE PAIN (4)

## 2023-06-22 NOTE — PROGRESS NOTES
[address], [address]  [facility] [address]  Phone: [phone]  Primary Provider: Angela Reich  Pre-op Performing Provider: ANGELA REICH      PREOPERATIVE EVALUATION:  Today's date: 6/22/2023    Mildred Quintana is a 48 year old male who presents for a preoperative evaluation.    Surgical Information:  Surgery/Procedure: Cervical Surgery   Surgery Location: [facility]   Surgeon: Dr. Blu Pineda   Surgery Date: 7/10/2023  Time of Surgery: TBD   Where patient plans to recover: At home with family  Fax number for surgical facility: [phone]    Assessment & Plan     The proposed surgical procedure is considered INTERMEDIATE risk.    Preop general physical exam  Cervical myelopathy (H)    Check CBC and CMP today.  EKG unremarkable.  Cleared for surgery pending labs.  Hold NSAIDs including Mobic 1 week prior to surgery.  No vitamin/NSAIDs.  Continue all other medications leading up to surgery as scheduled.  He is comfortable with this plan.  Will fax H&P, labs, and EKG to his surgical team.    - CBC with platelets and differential  - Comprehensive metabolic panel (BMP + Alb, Alk Phos, ALT, AST, Total. Bili, TP)    RECOMMENDATION:  APPROVAL GIVEN to proceed with proposed procedure pending review of diagnostic evaluation.    30 minutes spent by me on the date of the encounter doing chart review, review of test results, interpretation of tests, patient visit and documentation       Subjective       HPI related to upcoming procedure:     Here for preoperative clearance for upcoming cervical neck surgery for cervical myelopathy.  This is scheduled for 7/10/2023 with Dr. Pineda at [facility].  Patient has had no issues with anesthesia in the past.  Notes last time he had anesthesia he was dealing with hiccups for a long time afterwards.  Denies chest pain, shortness of breath, palpitations, or new leg swelling.  No history of heart attack, stroke, or blood clot.  Pain is  well-managed at this time.  Using meloxicam, Robaxin, gabapentin, and tramadol PRN.         6/19/2023    10:00 AM   Preop Questions   1. Have you ever had a heart attack or stroke? No   2. Have you ever had surgery on your heart or blood vessels, such as a stent placement, a coronary artery bypass, or surgery on an artery in your head, neck, heart, or legs? No   3. Do you have chest pain with activity? No   4. Do you have a history of  heart failure? No   5. Do you currently have a cold, bronchitis or symptoms of other infection? No   6. Do you have a cough, shortness of breath, or wheezing? No   7. Do you or anyone in your family have previous history of blood clots? No   8. Do you or does anyone in your family have a serious bleeding problem such as prolonged bleeding following surgeries or cuts? No   9. Have you ever had problems with anemia or been told to take iron pills? No   10. Have you had any abnormal blood loss such as black, tarry or bloody stools? No   11. Have you ever had a blood transfusion? No   12. Are you willing to have a blood transfusion if it is medically needed before, during, or after your surgery? Yes   13. Have you or any of your relatives ever had problems with anesthesia? UNKNOWN   14. Do you have sleep apnea, excessive snoring or daytime drowsiness? No   15. Do you have any artifical heart valves or other implanted medical devices like a pacemaker, defibrillator, or continuous glucose monitor? No   16. Do you have artificial joints? No   17. Are you allergic to latex? No       Preoperative Review of :   reviewed - controlled substances reflected in medication list.      Review of Systems  Constitutional, neuro, ENT, endocrine, pulmonary, cardiac, gastrointestinal, genitourinary, musculoskeletal, integument and psychiatric systems are negative, except as otherwise noted.    Patient Active Problem List    Diagnosis Date Noted     Cervical myelopathy (H) 04/14/2023     Priority:  "Medium     Thyroid nodule 04/14/2023     Priority: Medium      Past Medical History:   Diagnosis Date     Prediabetes      Past Surgical History:   Procedure Laterality Date     NO HISTORY OF SURGERY       Current Outpatient Medications   Medication Sig Dispense Refill     gabapentin (NEURONTIN) 100 MG capsule Take 100 mg by mouth 3 times daily       meloxicam (MOBIC) 15 MG tablet Take 15 mg by mouth daily       methocarbamol (ROBAXIN) 500 MG tablet Take 1 tablet (500 mg) by mouth 2 times daily as needed for muscle spasms 30 tablet 1     traMADol (ULTRAM) 50 MG tablet Take 50 mg by mouth every 6 hours as needed       escitalopram (LEXAPRO) 10 MG tablet Take 1 tablet by mouth daily at 2 pm (Patient not taking: Reported on 6/22/2023)         Allergies   Allergen Reactions     No Known Allergies         Social History     Tobacco Use     Smoking status: Former     Smokeless tobacco: Never   Substance Use Topics     Alcohol use: Not Currently     Family History   Problem Relation Age of Onset     Diabetes Mother      Hypertension Father      History   Drug Use Unknown         Objective     /79 (BP Location: Left arm, Patient Position: Sitting, Cuff Size: Adult Large)   Pulse 77   Temp 99.1  F (37.3  C) (Temporal)   Resp 18   Ht 1.746 m (5' 8.75\")   Wt 90.6 kg (199 lb 12.8 oz)   SpO2 97%   BMI 29.72 kg/m      Physical Exam    GENERAL APPEARANCE: healthy, alert and no distress     EYES: EOMI,  PERRL     NECK: no adenopathy, no asymmetry, masses, or scars and thyroid normal to palpation     RESP: lungs clear to auscultation - no rales, rhonchi or wheezes     CV: regular rates and rhythm, normal S1 S2, no S3 or S4 and no murmur, click or rub     MS: extremities normal- no gross deformities noted, no evidence of inflammation in joints, FROM in all extremities.     SKIN: no suspicious lesions or rashes     NEURO: sensory exam grossly normal, mentation intact and speech normal       PSYCH: mentation appears " normal. and affect normal/bright     LYMPHATICS: No cervical adenopathy    Recent Labs   Lab Test 04/25/23  1652 11/17/22  1039   HGB 14.8 14.1     --     136   POTASSIUM 4.5 4.4   CR 1.21* 1.06   A1C 5.5 5.4        Diagnostics:  Labs pending at this time.  Results will be reviewed when available.   EKG is normal sinus rhythm.  Normal axis.  No acute ST or T wave changes.    Revised Cardiac Risk Index (RCRI):  The patient has the following serious cardiovascular risks for perioperative complications:   - No serious cardiac risks = 0 points     RCRI Interpretation: 0 points: Class I (very low risk - 0.4% complication rate)    The likelihood of other entities in the differential is insufficient to justify any further testing for them at this time. This was explained to the patient. The patient was advised that persistent or worsening symptoms would require further evaluation. Patient advised to call the office and if unable to reach to go to the emergency room if they develop any new or worsening symptoms. Expressed understanding and agreement with above stated plan.       Signed Electronically by: Edvin Reich PA-C  Copy of this evaluation report is provided to requesting physician.

## 2023-06-29 ENCOUNTER — MYC MEDICAL ADVICE (OUTPATIENT)
Dept: FAMILY MEDICINE | Facility: CLINIC | Age: 48
End: 2023-06-29
Payer: COMMERCIAL

## 2023-06-29 DIAGNOSIS — G95.9 CERVICAL MYELOPATHY (H): Primary | ICD-10-CM

## 2023-06-29 RX ORDER — TRAMADOL HYDROCHLORIDE 50 MG/1
50 TABLET ORAL EVERY 6 HOURS PRN
Qty: 15 TABLET | Refills: 0 | Status: SHIPPED | OUTPATIENT
Start: 2023-06-29

## 2023-06-30 ENCOUNTER — MYC MEDICAL ADVICE (OUTPATIENT)
Dept: FAMILY MEDICINE | Facility: CLINIC | Age: 48
End: 2023-06-30
Payer: COMMERCIAL

## 2023-06-30 DIAGNOSIS — Z87.898 HISTORY OF PREDIABETES: Primary | ICD-10-CM

## 2023-07-03 ENCOUNTER — MYC MEDICAL ADVICE (OUTPATIENT)
Dept: FAMILY MEDICINE | Facility: CLINIC | Age: 48
End: 2023-07-03
Payer: COMMERCIAL

## 2023-07-03 DIAGNOSIS — R73.03 PREDIABETES: Primary | ICD-10-CM

## 2023-07-10 ENCOUNTER — TRANSFERRED RECORDS (OUTPATIENT)
Dept: HEALTH INFORMATION MANAGEMENT | Facility: CLINIC | Age: 48
End: 2023-07-10

## 2024-03-26 ENCOUNTER — PATIENT OUTREACH (OUTPATIENT)
Dept: CARE COORDINATION | Facility: CLINIC | Age: 49
End: 2024-03-26
Payer: COMMERCIAL

## 2024-04-05 ENCOUNTER — ORDERS ONLY (AUTO-RELEASED) (OUTPATIENT)
Dept: FAMILY MEDICINE | Facility: CLINIC | Age: 49
End: 2024-04-05

## 2024-04-05 ENCOUNTER — OFFICE VISIT (OUTPATIENT)
Dept: FAMILY MEDICINE | Facility: CLINIC | Age: 49
End: 2024-04-05
Payer: COMMERCIAL

## 2024-04-05 VITALS
WEIGHT: 195.6 LBS | DIASTOLIC BLOOD PRESSURE: 80 MMHG | HEART RATE: 75 BPM | OXYGEN SATURATION: 98 % | BODY MASS INDEX: 29.1 KG/M2 | SYSTOLIC BLOOD PRESSURE: 120 MMHG | RESPIRATION RATE: 20 BRPM | TEMPERATURE: 97.6 F

## 2024-04-05 DIAGNOSIS — Z11.59 NEED FOR HEPATITIS C SCREENING TEST: ICD-10-CM

## 2024-04-05 DIAGNOSIS — Z00.00 ROUTINE GENERAL MEDICAL EXAMINATION AT A HEALTH CARE FACILITY: Primary | ICD-10-CM

## 2024-04-05 DIAGNOSIS — M54.9 UPPER BACK PAIN: ICD-10-CM

## 2024-04-05 DIAGNOSIS — R73.9 HYPERGLYCEMIA: ICD-10-CM

## 2024-04-05 DIAGNOSIS — Z00.00 ROUTINE GENERAL MEDICAL EXAMINATION AT A HEALTH CARE FACILITY: ICD-10-CM

## 2024-04-05 DIAGNOSIS — Z98.1 S/P CERVICAL SPINAL FUSION: ICD-10-CM

## 2024-04-05 DIAGNOSIS — R19.5 POSITIVE COLORECTAL CANCER SCREENING USING COLOGUARD TEST: ICD-10-CM

## 2024-04-05 DIAGNOSIS — G95.9 CERVICAL MYELOPATHY (H): ICD-10-CM

## 2024-04-05 DIAGNOSIS — M62.830 BACK MUSCLE SPASM: ICD-10-CM

## 2024-04-05 DIAGNOSIS — E78.5 DYSLIPIDEMIA: ICD-10-CM

## 2024-04-05 DIAGNOSIS — H61.23 CERUMEN DEBRIS ON TYMPANIC MEMBRANE OF BOTH EARS: ICD-10-CM

## 2024-04-05 DIAGNOSIS — Z11.4 SCREENING FOR HIV (HUMAN IMMUNODEFICIENCY VIRUS): ICD-10-CM

## 2024-04-05 DIAGNOSIS — Z12.11 SCREEN FOR COLON CANCER: ICD-10-CM

## 2024-04-05 DIAGNOSIS — R10.11 RUQ ABDOMINAL PAIN: ICD-10-CM

## 2024-04-05 LAB
ERYTHROCYTE [DISTWIDTH] IN BLOOD BY AUTOMATED COUNT: 11.1 % (ref 10–15)
HBA1C MFR BLD: 5.7 % (ref 0–5.6)
HCT VFR BLD AUTO: 48.4 % (ref 40–53)
HGB BLD-MCNC: 15.7 G/DL (ref 13.3–17.7)
MCH RBC QN AUTO: 29.6 PG (ref 26.5–33)
MCHC RBC AUTO-ENTMCNC: 32.4 G/DL (ref 31.5–36.5)
MCV RBC AUTO: 91 FL (ref 78–100)
PLATELET # BLD AUTO: 126 10E3/UL (ref 150–450)
RBC # BLD AUTO: 5.31 10E6/UL (ref 4.4–5.9)
WBC # BLD AUTO: 4.5 10E3/UL (ref 4–11)

## 2024-04-05 PROCEDURE — 85027 COMPLETE CBC AUTOMATED: CPT | Performed by: INTERNAL MEDICINE

## 2024-04-05 PROCEDURE — 80053 COMPREHEN METABOLIC PANEL: CPT | Performed by: INTERNAL MEDICINE

## 2024-04-05 PROCEDURE — 99214 OFFICE O/P EST MOD 30 MIN: CPT | Mod: 25 | Performed by: INTERNAL MEDICINE

## 2024-04-05 PROCEDURE — 86803 HEPATITIS C AB TEST: CPT | Performed by: INTERNAL MEDICINE

## 2024-04-05 PROCEDURE — 36415 COLL VENOUS BLD VENIPUNCTURE: CPT | Performed by: INTERNAL MEDICINE

## 2024-04-05 PROCEDURE — 80061 LIPID PANEL: CPT | Performed by: INTERNAL MEDICINE

## 2024-04-05 PROCEDURE — 83036 HEMOGLOBIN GLYCOSYLATED A1C: CPT | Performed by: INTERNAL MEDICINE

## 2024-04-05 PROCEDURE — 87389 HIV-1 AG W/HIV-1&-2 AB AG IA: CPT | Performed by: INTERNAL MEDICINE

## 2024-04-05 PROCEDURE — 99396 PREV VISIT EST AGE 40-64: CPT | Performed by: INTERNAL MEDICINE

## 2024-04-05 RX ORDER — TRIAMCINOLONE ACETONIDE 1 MG/G
CREAM TOPICAL 2 TIMES DAILY
COMMUNITY
Start: 2024-02-08

## 2024-04-05 RX ORDER — METHOCARBAMOL 500 MG/1
500 TABLET, FILM COATED ORAL 2 TIMES DAILY PRN
Qty: 30 TABLET | Refills: 1 | Status: SHIPPED | OUTPATIENT
Start: 2024-04-05

## 2024-04-05 ASSESSMENT — PAIN SCALES - GENERAL: PAINLEVEL: NO PAIN (1)

## 2024-04-05 NOTE — PATIENT INSTRUCTIONS
As discussed, please do fasting labs placed    Will send muscle relaxor and physical therapy for the upper back pain     Please follow up with [facility] for your neck surgery concerns.     =====================      Preventive Care Advice   This is general advice given by our system to help you stay healthy. However, your care team may have specific advice just for you. Please talk to your care team about your preventive care needs.  Nutrition  Eat 5 or more servings of fruits and vegetables each day.  Try wheat bread, brown rice and whole grain pasta (instead of white bread, rice, and pasta).  Get enough calcium and vitamin D. Check the label on foods and aim for 100% of the RDA (recommended daily allowance).  Lifestyle  Exercise at least 150 minutes each week   (30 minutes a day, 5 days a week).  Do muscle strengthening activities 2 days a week. These help control your weight and prevent disease.  No smoking.  Wear sunscreen to prevent skin cancer.  Have a dental exam and cleaning every 6 months.  Yearly exams  See your health care team every year to talk about:  Any changes in your health.  Any medicines your care team has prescribed.  Preventive care, family planning, and ways to prevent chronic diseases.  Shots (vaccines)   HPV shots (up to age 26), if you've never had them before.  Hepatitis B shots (up to age 59), if you've never had them before.  COVID-19 shot: Get this shot when it's due.  Flu shot: Get a flu shot every year.  Tetanus shot: Get a tetanus shot every 10 years.  Pneumococcal, hepatitis A, and RSV shots: Ask your care team if you need these based on your risk.  Shingles shot (for age 50 and up).  General health tests  Diabetes screening:  Starting at age 35, Get screened for diabetes at least every 3 years.  If you are younger than age 35, ask your care team if you should be screened for diabetes.  Cholesterol test: At age 39, start having a cholesterol test every 5 years, or more often if  advised.  Bone density scan (DEXA): At age 50, ask your care team if you should have this scan for osteoporosis (brittle bones).  Hepatitis C: Get tested at least once in your life.  STIs (sexually transmitted infections)  Before age 24: Ask your care team if you should be screened for STIs.  After age 24: Get screened for STIs if you're at risk. You are at risk for STIs (including HIV) if:  You are sexually active with more than one person.  You don't use condoms every time.  You or a partner was diagnosed with a sexually transmitted infection.  If you are at risk for HIV, ask about PrEP medicine to prevent HIV.  Get tested for HIV at least once in your life, whether you are at risk for HIV or not.  Cancer screening tests  Cervical cancer screening: If you have a cervix, begin getting regular cervical cancer screening tests at age 21. Most people who have regular screenings with normal results can stop after age 65. Talk about this with your provider.  Breast cancer scan (mammogram): If you've ever had breasts, begin having regular mammograms starting at age 40. This is a scan to check for breast cancer.  Colon cancer screening: It is important to start screening for colon cancer at age 45.  Have a colonoscopy test every 10 years (or more often if you're at risk) Or, ask your provider about stool tests like a FIT test every year or Cologuard test every 3 years.  To learn more about your testing options, visit: https://www.[employer]/776084.pdf.  For help making a decision, visit: https://bit.ly/qv75357.  Prostate cancer screening test: If you have a prostate and are age 55 to 69, ask your provider if you would benefit from a yearly prostate cancer screening test.  Lung cancer screening: If you are a current or former smoker age 50 to 80, ask your care team if ongoing lung cancer screenings are right for you.  For informational purposes only. Not to replace the advice of your health care provider. Copyright   2023  [facility]. All rights reserved. Clinically reviewed by the [facility] Transitions Program. [employer] (FIG) [id number] - REV 01/24.

## 2024-04-05 NOTE — PROGRESS NOTES
{PROVIDER CHARTING PREFERENCE:479617}    Subjective   Mildred is a 48 year old, presenting for the following health issues:  Pain (Upper right side, under ribs)        4/5/2024     9:38 AM   Additional Questions   Roomed by Rehana PIERCE     Pain  This is a new problem. The current episode started 1 to 4 weeks ago. The problem occurs intermittently. The problem has been gradually improving. The symptoms are aggravated by coughing and eating. He has tried nothing for the symptoms.   History of Present Illness       Reason for visit:  Flank pain upper right quadrant of abdomen just under right rib cage  Symptom onset:  1-2 weeks ago  Symptoms include:  Flank pain  Symptom intensity:  Mild  Symptom progression:  Improving  Had these symptoms before:  Yes  Has tried/received treatment for these symptoms:  No  What makes it worse:  Not sure  What makes it better:  Not sure    He eats 0-1 servings of fruits and vegetables daily.He consumes 1 sweetened beverage(s) daily.He exercises with enough effort to increase his heart rate 10 to 19 minutes per day.  He exercises with enough effort to increase his heart rate 3 or less days per week.   He is taking medications regularly.       {MA/LPN/RN Pre-Provider Visit Orders- hCG/UA/Strep (Optional):074355}  {SUPERLIST (Optional):494769}  {additonal problems for provider to add (Optional):058836}    {ROS Picklists (Optional):160074}      Objective    /80   Pulse 75   Temp 97.6  F (36.4  C)   Resp 20   Wt 88.7 kg (195 lb 9.6 oz)   SpO2 98%   BMI 29.10 kg/m    Body mass index is 29.1 kg/m .  Physical Exam   {Exam List (Optional):666951}    {Diagnostic Test Results (Optional):175447}        Signed Electronically by: Stefani Huynh MD  {Email feedback regarding this note to [email or web address]   :[id number]}

## 2024-04-05 NOTE — PROGRESS NOTES
Preventive Care Visit  [facility] GINO Huynh MD, Internal Medicine  Apr 5, 2024      Assessment and Plan  1. Routine general medical examination at a health care facility    Pt is new to me, last seen [facility] for Cervical myelopathy - C-Spine surgery in 6/2023.He is here for acute concerns of Rt upper abdominal pain.    Most part of this appointment patient has been unhappy on the bills he is receiving with [facility] visits as he endorses, he is here with acute visit today and opting for changing this to annual physical offered to him to minimize his visits symptoms.  Will do as requested.    - HIV Antigen Antibody Combo; Future  - Hepatitis C Screen Reflex to HCV RNA Quant and Genotype; Future  - REVIEW OF HEALTH MAINTENANCE PROTOCOL ORDERS  - triamcinolone (KENALOG) 0.1 % external cream; Apply topically 2 times daily to affected area(s)  - COLOGUARD([employer]ES); Future  - Comprehensive metabolic panel (BMP + Alb, Alk Phos, ALT, AST, Total. Bili, TP); Future  - CBC with platelets; Future  - Lipid panel reflex to direct LDL Fasting; Future  - Hemoglobin A1c; Future  - PRIMARY CARE FOLLOW-UP SCHEDULING; Future  - carbamide peroxide (DEBROX) 6.5 % otic solution; Place 5 drops into both ears 2 times daily  Dispense: 15 mL; Refill: 1  - HIV Antigen Antibody Combo  - Hepatitis C Screen Reflex to HCV RNA Quant and Genotype  - Comprehensive metabolic panel (BMP + Alb, Alk Phos, ALT, AST, Total. Bili, TP)  - CBC with platelets  - Lipid panel reflex to direct LDL Fasting  - Hemoglobin A1c    2. Cervical myelopathy (H)  3. S/P cervical spinal fusion in 7/2023 at [facility]  Chronic problem, patient following [facility].  Emphasized to keep up his appointments with [facility] as instructed to him.  Again patient had complaints of the bills he is supposed to be in would not want to go back again.  Physical exam showing well-healed surgical scars, does have right upper extremity radiculopathy which is much  improved as he endorses.  - methocarbamol (ROBAXIN) 500 MG tablet; Take 1 tablet (500 mg) by mouth 2 times daily as needed for muscle spasms  Dispense: 30 tablet; Refill: 1    4. RUQ abdominal pain  New problem, no tenderness on palpation on physical exam today-but it is only subjective intermittent flareups as he endorses.  -Shared decision to check his LFTs and baseline lab work before considering further need of ultrasound imaging of the abdomen which patient understood and agreed with the plan.  - Comprehensive metabolic panel (BMP + Alb, Alk Phos, ALT, AST, Total. Bili, TP); Future  - CBC with platelets; Future  - Comprehensive metabolic panel (BMP + Alb, Alk Phos, ALT, AST, Total. Bili, TP)  - CBC with platelets    5. Upper back pain  7. Back muscle spasm  New problem, physical exam positive for tenderness/muscle spasm on left interscapular muscle region, no restriction of range of movements of the shoulder joint.  Shared decision for physical therapy as well as muscle relaxer for improvement.  Patient understood and agreed with the plan  - Physical Therapy  Referral; Future  - methocarbamol (ROBAXIN) 500 MG tablet; Take 1 tablet (500 mg) by mouth 2 times daily as needed for muscle spasms  Dispense: 30 tablet; Refill: 1    6. Dyslipidemia  - Lipid panel reflex to direct LDL Fasting; Future  - Lipid panel reflex to direct LDL Fasting    8. Hyperglycemia  - Hemoglobin A1c; Future  - Hemoglobin A1c    9. Cerumen debris on tympanic membrane of both ears  - carbamide peroxide (DEBROX) 6.5 % otic solution; Place 5 drops into both ears 2 times daily  Dispense: 15 mL; Refill: 1    10. Screen for colon cancer  - NIKOLAS([employer]ES); Future    11. Screening for HIV (human immunodeficiency virus)  - HIV Antigen Antibody Combo; Future  - HIV Antigen Antibody Combo    12. Need for hepatitis C screening test  - Hepatitis C Screen Reflex to HCV RNA Quant and Genotype; Future  - Hepatitis C Screen Reflex to HCV  RNA Quant and Genotype         Please note that this note consists of symbols derived from keyboarding, dictation and/or voice recognition software. As a result, there may be errors in the script that have gone undetected. Please consider this when interpreting information found in this chart.    Patient Instructions   As discussed, please do fasting labs placed    Will send muscle relaxor and physical therapy for the upper back pain     Please follow up with [facility] for your neck surgery concerns.     =====================      Preventive Care Advice   This is general advice given by our system to help you stay healthy. However, your care team may have specific advice just for you. Please talk to your care team about your preventive care needs.  Nutrition  Eat 5 or more servings of fruits and vegetables each day.  Try wheat bread, brown rice and whole grain pasta (instead of white bread, rice, and pasta).  Get enough calcium and vitamin D. Check the label on foods and aim for 100% of the RDA (recommended daily allowance).  Lifestyle  Exercise at least 150 minutes each week   (30 minutes a day, 5 days a week).  Do muscle strengthening activities 2 days a week. These help control your weight and prevent disease.  No smoking.  Wear sunscreen to prevent skin cancer.  Have a dental exam and cleaning every 6 months.  Yearly exams  See your health care team every year to talk about:  Any changes in your health.  Any medicines your care team has prescribed.  Preventive care, family planning, and ways to prevent chronic diseases.  Shots (vaccines)   HPV shots (up to age 26), if you've never had them before.  Hepatitis B shots (up to age 59), if you've never had them before.  COVID-19 shot: Get this shot when it's due.  Flu shot: Get a flu shot every year.  Tetanus shot: Get a tetanus shot every 10 years.  Pneumococcal, hepatitis A, and RSV shots: Ask your care team if you need these based on your risk.  Shingles shot (for age  50 and up).  General health tests  Diabetes screening:  Starting at age 35, Get screened for diabetes at least every 3 years.  If you are younger than age 35, ask your care team if you should be screened for diabetes.  Cholesterol test: At age 39, start having a cholesterol test every 5 years, or more often if advised.  Bone density scan (DEXA): At age 50, ask your care team if you should have this scan for osteoporosis (brittle bones).  Hepatitis C: Get tested at least once in your life.  STIs (sexually transmitted infections)  Before age 24: Ask your care team if you should be screened for STIs.  After age 24: Get screened for STIs if you're at risk. You are at risk for STIs (including HIV) if:  You are sexually active with more than one person.  You don't use condoms every time.  You or a partner was diagnosed with a sexually transmitted infection.  If you are at risk for HIV, ask about PrEP medicine to prevent HIV.  Get tested for HIV at least once in your life, whether you are at risk for HIV or not.  Cancer screening tests  Cervical cancer screening: If you have a cervix, begin getting regular cervical cancer screening tests at age 21. Most people who have regular screenings with normal results can stop after age 65. Talk about this with your provider.  Breast cancer scan (mammogram): If you've ever had breasts, begin having regular mammograms starting at age 40. This is a scan to check for breast cancer.  Colon cancer screening: It is important to start screening for colon cancer at age 45.  Have a colonoscopy test every 10 years (or more often if you're at risk) Or, ask your provider about stool tests like a FIT test every year or Cologuard test every 3 years.  To learn more about your testing options, visit: https://www.[employer]/203943.pdf.  For help making a decision, visit: https://bit.ly/hb03267.  Prostate cancer screening test: If you have a prostate and are age 55 to 69, ask your provider if you  would benefit from a yearly prostate cancer screening test.  Lung cancer screening: If you are a current or former smoker age 50 to 80, ask your care team if ongoing lung cancer screenings are right for you.  For informational purposes only. Not to replace the advice of your health care provider. Copyright   2023 [employer]. All rights reserved. Clinically reviewed by the [facility] Transitions Program. [employer] 898169 - REV 01/24.     Return in about 6 months (around 10/5/2024), or if symptoms worsen or fail to improve, for If symptoms persist, Follow up of last visit.    Stefani Huynh MD  [facility]      Subjective   Adarryl is a 48 year old, presenting for the following:  Pain (Upper right side, under ribs)        4/5/2024     9:38 AM   Additional Questions   Roomed by Rehana PIERCE        Health Care Directive  Patient does not have a Health Care Directive or Living Will:     History of Present Illness       Reason for visit:  Flank pain upper right quadrant of abdomen just under right rib cage  Symptom onset:  1-2 weeks ago  Symptoms include:  Flank pain  Symptom intensity:  Mild  Symptom progression:  Improving  Had these symptoms before:  Yes  Has tried/received treatment for these symptoms:  No  What makes it worse:  Not sure  What makes it better:  Not sure    He eats 0-1 servings of fruits and vegetables daily.He consumes 1 sweetened beverage(s) daily.He exercises with enough effort to increase his heart rate 10 to 19 minutes per day.  He exercises with enough effort to increase his heart rate 3 or less days per week.   He is taking medications regularly.        4/25/2023   Nutrition   Three or more servings of calcium each day? No   Diet: regular (no restrictions)         4/25/2023   Exercise   Frequency of exercise: 1 day/week            4/25/2023   Dental   Dentist two times every year? No            Today's PHQ-2 Score:       4/4/2024     6:19 PM   PHQ-2  ( 1999 [employer])   Q1: Little interest or pleasure in doing things 0   Q2: Feeling down, depressed or hopeless 0   PHQ-2 Score 0   Q1: Little interest or pleasure in doing things Not at all   Q2: Feeling down, depressed or hopeless Not at all   PHQ-2 Score 0           4/25/2023   Substance Use   Alcohol more than 3/day or more than 7/wk No     Social History     Tobacco Use    Smoking status: Some Days     Types: Cigarettes, Cigars, cigarillos or filtered cigars    Smokeless tobacco: Never   Vaping Use    Vaping Use: Never used   Substance Use Topics    Alcohol use: Not Currently    Drug use: Never       ASCVD Risk   The 10-year ASCVD risk score (Sary CULLEN, et al., 2019) is: 7.1%    Values used to calculate the score:      Age: 48 years      Sex: Male      Is Non- : Yes      Diabetic: No      Tobacco smoker: Yes      Systolic Blood Pressure: 120 mmHg      Is BP treated: No      HDL Cholesterol: 61 mg/dL      Total Cholesterol: 215 mg/dL         No data to display                 Reviewed and updated as needed this visit by Provider   Tobacco  Allergies  Meds  Problems  Med Hx  Surg Hx  Fam Hx            Past Medical History:   Diagnosis Date    Prediabetes      Past Surgical History:   Procedure Laterality Date    NO HISTORY OF SURGERY       Lab work is in process  Labs reviewed in EPIC  BP Readings from Last 3 Encounters:   04/05/24 120/80   06/22/23 117/79   05/25/23 125/83    Wt Readings from Last 3 Encounters:   04/05/24 88.7 kg (195 lb 9.6 oz)   06/22/23 90.6 kg (199 lb 12.8 oz)   05/25/23 91.6 kg (202 lb)                  Patient Active Problem List   Diagnosis    Cervical myelopathy (H)    Thyroid nodule    S/P cervical spinal fusion in 7/2023 at [facility]     Past Surgical History:   Procedure Laterality Date    NO HISTORY OF SURGERY         Social History     Tobacco Use    Smoking status: Some Days     Types: Cigarettes, Cigars, cigarillos or filtered cigars    Smokeless  "tobacco: Never   Substance Use Topics    Alcohol use: Not Currently     Family History   Problem Relation Age of Onset    Diabetes Mother     Hypertension Father          Current Outpatient Medications   Medication Sig Dispense Refill    carbamide peroxide (DEBROX) 6.5 % otic solution Place 5 drops into both ears 2 times daily 15 mL 1    methocarbamol (ROBAXIN) 500 MG tablet Take 1 tablet (500 mg) by mouth 2 times daily as needed for muscle spasms 30 tablet 1    traMADol (ULTRAM) 50 MG tablet Take 1 tablet (50 mg) by mouth every 6 hours as needed for severe pain 15 tablet 0    triamcinolone (KENALOG) 0.1 % external cream Apply topically 2 times daily to affected area(s)      blood glucose (NO BRAND SPECIFIED) lancets standard Use to test blood sugar as directed. 100 each 1    blood glucose (NO BRAND SPECIFIED) test strip Use to test blood sugar as directed. 100 strip 1    blood glucose monitoring (NO BRAND SPECIFIED) meter device kit Use to test blood sugar as directed. 1 kit 0    meloxicam (MOBIC) 15 MG tablet Take 15 mg by mouth daily (Patient not taking: Reported on 4/5/2024)       Allergies   Allergen Reactions    No Known Allergies      Recent Labs   Lab Test 04/05/24  1039 06/22/23  1115 04/25/23  1652 11/17/22  1039   A1C 5.7*  --  5.5 5.4   LDL  --   --  134*  --    HDL  --   --  61  --    TRIG  --   --  99  --    ALT  --  12 25  --    CR  --  1.07 1.21* 1.06   GFRESTIMATED  --  86 74 87   POTASSIUM  --  4.8 4.5 4.4   TSH  --   --  0.55  --           Review of Systems  Constitutional, HEENT, cardiovascular, pulmonary, GI, , musculoskeletal, neuro, skin, endocrine and psych systems are negative, except as otherwise noted.     Objective    Exam  /80   Pulse 75   Temp 97.6  F (36.4  C)   Resp 20   Wt 88.7 kg (195 lb 9.6 oz)   SpO2 98%   BMI 29.10 kg/m     Estimated body mass index is 29.1 kg/m  as calculated from the following:    Height as of 6/22/23: 1.746 m (5' 8.75\").    Weight as of this " encounter: 88.7 kg (195 lb 9.6 oz).    Physical Exam  GENERAL: alert and no distress  EYES: Eyes grossly normal to inspection, PERRL and conjunctivae and sclerae normal  HENT: ear canals and TM's + bilateral mild cerumen, nose and mouth without ulcers or lesions  NECK: no adenopathy, no asymmetry, masses, or scars  Positive for tenderness/muscle spasm on left interscapular muscle region, no restriction of range of movements of the shoulder joint.   RESP: lungs clear to auscultation - no rales, rhonchi or wheezes  CV: regular rate and rhythm, normal S1 S2, no S3 or S4, no murmur, click or rub, no peripheral edema  ABDOMEN: soft, nontender, no hepatosplenomegaly, no masses and bowel sounds normal  MS: no gross musculoskeletal defects noted, no edema  SKIN: no suspicious lesions or rashes  NEURO: Normal strength and tone, mentation intact and speech normal  PSYCH: mentation appears normal, affect normal/bright        Signed Electronically by: Stefani Huynh MD

## 2024-04-06 LAB
ALBUMIN SERPL BCG-MCNC: 4.4 G/DL (ref 3.5–5.2)
ALP SERPL-CCNC: 63 U/L (ref 40–150)
ALT SERPL W P-5'-P-CCNC: 15 U/L (ref 0–70)
ANION GAP SERPL CALCULATED.3IONS-SCNC: 14 MMOL/L (ref 7–15)
AST SERPL W P-5'-P-CCNC: 22 U/L (ref 0–45)
BILIRUB SERPL-MCNC: 0.6 MG/DL
BUN SERPL-MCNC: 11.3 MG/DL (ref 6–20)
CALCIUM SERPL-MCNC: 9.5 MG/DL (ref 8.6–10)
CHLORIDE SERPL-SCNC: 101 MMOL/L (ref 98–107)
CHOLEST SERPL-MCNC: 185 MG/DL
CREAT SERPL-MCNC: 1.01 MG/DL (ref 0.67–1.17)
DEPRECATED HCO3 PLAS-SCNC: 22 MMOL/L (ref 22–29)
EGFRCR SERPLBLD CKD-EPI 2021: >90 ML/MIN/1.73M2
FASTING STATUS PATIENT QL REPORTED: YES
GLUCOSE SERPL-MCNC: 129 MG/DL (ref 70–99)
HCV AB SERPL QL IA: NONREACTIVE
HDLC SERPL-MCNC: 52 MG/DL
HIV 1+2 AB+HIV1 P24 AG SERPL QL IA: NONREACTIVE
LDLC SERPL CALC-MCNC: 118 MG/DL
NONHDLC SERPL-MCNC: 133 MG/DL
POTASSIUM SERPL-SCNC: 4.5 MMOL/L (ref 3.4–5.3)
PROT SERPL-MCNC: 7.3 G/DL (ref 6.4–8.3)
SODIUM SERPL-SCNC: 137 MMOL/L (ref 135–145)
TRIGL SERPL-MCNC: 73 MG/DL

## 2024-04-07 NOTE — RESULT ENCOUNTER NOTE
Good news! Your Cholesterol improved compared to last year Continue diet and lifestyle modifications.     Your platelet counts are mildly low for unspecified reasons.Will need follow up in your yearly labs.     Your lab work is POSITIVE for Prediabetes which is causing your symptoms of fatigue. Please follow the diet below for improvement. Will need follow up on this.

## 2024-04-18 ENCOUNTER — THERAPY VISIT (OUTPATIENT)
Dept: PHYSICAL THERAPY | Facility: CLINIC | Age: 49
End: 2024-04-18
Attending: INTERNAL MEDICINE
Payer: COMMERCIAL

## 2024-04-18 DIAGNOSIS — M54.9 UPPER BACK PAIN: ICD-10-CM

## 2024-04-18 DIAGNOSIS — M62.830 BACK MUSCLE SPASM: ICD-10-CM

## 2024-04-18 PROCEDURE — 97110 THERAPEUTIC EXERCISES: CPT | Mod: GP | Performed by: PHYSICAL THERAPIST

## 2024-04-18 PROCEDURE — 97161 PT EVAL LOW COMPLEX 20 MIN: CPT | Mod: GP | Performed by: PHYSICAL THERAPIST

## 2024-04-18 NOTE — PROGRESS NOTES
PHYSICAL THERAPY EVALUATION  Type of Visit: Evaluation    See electronic medical record for Abuse and Falls Screening details.    Subjective       Presenting condition or subjective complaint: Shoulder blade pain  Pt reports that he has a cervical fusion on 7/10/23 (C6-T1). Since that time and possibly mildly prior to surgery is noticing L scapular pain. Has to use his L arm more due to residual weakness on the R. Notices that pain in the L scapula after intercourse. Saw primary care for annual physical and referred to PT.     Date of onset: 07/10/23    Relevant medical history:     Dates & types of surgery: Cervical fusion 7/10/23    Prior diagnostic imaging/testing results:       Prior therapy history for the same diagnosis, illness or injury: No      Prior Level of Function  Transfers:   Ambulation:   ADL:   IADL:     Living Environment  Social support: Alone   Type of home: Apartment/condo   Stairs to enter the home: Yes 15 Is there a railing: Yes   Ramp: No   Stairs inside the home: No       Help at home: None  Equipment owned:       Employment: Yes Pharmacist  Hobbies/Interests: Reading ,working out    Patient goals for therapy: Strengthen muscle and alleviate pain    Pain assessment: See objective evaluation for additional pain details     Objective   CERVICAL SPINE EVALUATION  PAIN: Pain Level at Rest: 2/10  Pain Level with Use: 6/10  Pain Location: L scapular  Pain Quality: Aching and Dull  Pain Frequency: constant  Pain is Worst: activity related  Pain is Exacerbated By: after intercourse/using arm, lying on L side  Pain is Relieved By: pain/muscle relaxers, massage  Pain Progression: possibly slightly better  INTEGUMENTARY (edema, incisions):   POSTURE: Sitting Posture: Forward head  GAIT:   Weightbearing Status:   Assistive Device(s):   Gait Deviations:   BALANCE/PROPRIOCEPTION:   WEIGHTBEARING ALIGNMENT:   ROM:   (Degrees) Left AROM Right AROM    Cervical Flexion Min loss    Cervical Extension Min loss     Cervical Side bend Nil to min loss Nil to min loss    Cervical Rotation nil nil    Cervical Protrusion nil    Cervical Retraction Nil to min loss    Thoracic Flexion     Thoracic Extension     Thoracic Rotation       Left AROM Left PROM Right AROM Right PROM   Shoulder Flexion WNL  WNL    Shoulder Extension       Shoulder Abduction WNL  WNL    Shoulder Adduction       Shoulder IR WNL  WNL    Shoulder ER WNL  WNL    Shoulder Horiz Abduction       Shoulder Horiz Adduction       Pain:   End Feel:   Symptomatic response Mechanical response    During testing After testing Effect - increased ROM, decreased ROM, or key functional test No Effect   Pretest symptoms sittin/10 L scapular pain     Rep PRO       Rep RET No Effect No Effect NE    Rep RET EXT                    MYOTOMES:    Left Right   C1-2 (Neck Flexion)     C3 (Neck Side Bend)      C4 (Shrug)     C5 (Deltoid) 5 5   C6 (Biceps) 5 5   C7 (Triceps) 5 5   C8 (Thumb Ext) 5    T1 (Intrinsics)       DTR S:   CORD SIGNS:   DERMATOMES:   NEURAL TENSION:   FLEXIBILITY:    SPECIAL TESTS:   PALPATION:  tender upon palpation of medial border of L scapula/rhomboids  SPINAL SEGMENTAL CONCLUSIONS:       Assessment & Plan   CLINICAL IMPRESSIONS  Medical Diagnosis: Upper back pain, Back muscle spasm    Treatment Diagnosis: L scapular pain   Impression/Assessment: Patient is a 48 year old male with L scapular complaints.  The following significant findings have been identified: Pain, Decreased ROM/flexibility, Decreased activity tolerance, and Impaired posture. These impairments interfere with their ability to perform self care tasks and recreational activities as compared to previous level of function.     Clinical Decision Making (Complexity):  Clinical Presentation: Stable/Uncomplicated  Clinical Presentation Rationale: based on medical and personal factors listed in PT evaluation  Clinical Decision Making (Complexity): Low complexity    PLAN OF CARE  Treatment  Interventions:  Interventions: Manual Therapy, Neuromuscular Re-education, Therapeutic Activity, Therapeutic Exercise    Long Term Goals     PT Goal 1  Goal Identifier: Lifting  Goal Description: Pt will be able to hold himself up with use of B arms without increased scapular pain during intercourse  Rationale: to maximize safety and independence with performance of ADLs and functional tasks;to maximize safety and independence with self cares  Target Date: 06/13/24      Frequency of Treatment: 1x/wk  Duration of Treatment: 8 wks    Recommended Referrals to Other Professionals:   Education Assessment:        Risks and benefits of evaluation/treatment have been explained.   Patient/Family/caregiver agrees with Plan of Care.     Evaluation Time:     PT Eval, Low Complexity Minutes (83109): 22       Signing Clinician: Sree Andrade PT

## 2024-04-22 DIAGNOSIS — R73.03 PREDIABETES: ICD-10-CM

## 2024-04-25 ENCOUNTER — THERAPY VISIT (OUTPATIENT)
Dept: PHYSICAL THERAPY | Facility: CLINIC | Age: 49
End: 2024-04-25
Payer: COMMERCIAL

## 2024-04-25 DIAGNOSIS — M54.9 UPPER BACK PAIN: Primary | ICD-10-CM

## 2024-04-25 DIAGNOSIS — M62.830 BACK MUSCLE SPASM: ICD-10-CM

## 2024-04-25 PROCEDURE — 97112 NEUROMUSCULAR REEDUCATION: CPT | Mod: GP | Performed by: PHYSICAL THERAPIST

## 2024-04-25 PROCEDURE — 97110 THERAPEUTIC EXERCISES: CPT | Mod: GP | Performed by: PHYSICAL THERAPIST

## 2024-04-25 PROCEDURE — 97140 MANUAL THERAPY 1/> REGIONS: CPT | Mod: GP | Performed by: PHYSICAL THERAPIST

## 2024-05-03 ENCOUNTER — THERAPY VISIT (OUTPATIENT)
Dept: PHYSICAL THERAPY | Facility: CLINIC | Age: 49
End: 2024-05-03
Payer: COMMERCIAL

## 2024-05-03 DIAGNOSIS — M54.9 UPPER BACK PAIN: Primary | ICD-10-CM

## 2024-05-03 DIAGNOSIS — M62.830 BACK MUSCLE SPASM: ICD-10-CM

## 2024-05-03 LAB — NONINV COLON CA DNA+OCC BLD SCRN STL QL: POSITIVE

## 2024-05-03 PROCEDURE — 97140 MANUAL THERAPY 1/> REGIONS: CPT | Mod: GP | Performed by: PHYSICAL THERAPIST

## 2024-05-03 PROCEDURE — 97110 THERAPEUTIC EXERCISES: CPT | Mod: GP | Performed by: PHYSICAL THERAPIST

## 2024-05-03 PROCEDURE — 97112 NEUROMUSCULAR REEDUCATION: CPT | Mod: GP | Performed by: PHYSICAL THERAPIST

## 2024-05-07 ENCOUNTER — MYC MEDICAL ADVICE (OUTPATIENT)
Dept: FAMILY MEDICINE | Facility: CLINIC | Age: 49
End: 2024-05-07
Payer: COMMERCIAL

## 2024-05-07 DIAGNOSIS — R19.5 POSITIVE COLORECTAL CANCER SCREENING USING COLOGUARD TEST: ICD-10-CM

## 2024-05-07 DIAGNOSIS — R10.11 RUQ ABDOMINAL PAIN: Primary | ICD-10-CM

## 2024-05-08 NOTE — TELEPHONE ENCOUNTER
Please see  message and advise.      Per chart review- only order is for a colonoscopy that was placed on 5/4/24. Does pt need both endoscopy and colonoscopy?

## 2024-05-10 ENCOUNTER — TELEPHONE (OUTPATIENT)
Dept: GASTROENTEROLOGY | Facility: CLINIC | Age: 49
End: 2024-05-10
Payer: COMMERCIAL

## 2024-05-10 DIAGNOSIS — Z12.11 COLON CANCER SCREENING: Primary | ICD-10-CM

## 2024-05-10 RX ORDER — BISACODYL 5 MG/1
TABLET, DELAYED RELEASE ORAL
Qty: 4 TABLET | Refills: 0 | Status: SHIPPED | OUTPATIENT
Start: 2024-05-10 | End: 2024-05-16

## 2024-05-10 RX ORDER — ONDANSETRON 4 MG/1
TABLET, FILM COATED ORAL
Qty: 3 TABLET | Refills: 0 | Status: SHIPPED | OUTPATIENT
Start: 2024-05-10

## 2024-05-10 NOTE — TELEPHONE ENCOUNTER
"Endoscopy Scheduling Screen    Have you had a positive Covid test in the last 14 days?  No    What is your communication preference for Instructions and/or Bowel Prep?   MyChart    What insurance is in the chart?  Other:      Ordering/Referring Provider:     KIMBERLY DODGE      (If ordering provider performs procedure, schedule with ordering provider unless otherwise instructed. )    BMI: Estimated body mass index is 29.1 kg/m  as calculated from the following:    Height as of 6/22/23: 1.746 m (5' 8.75\").    Weight as of 4/5/24: 88.7 kg (195 lb 9.6 oz).     Sedation Ordered  moderate sedation.   If patient BMI > 50 do not schedule in [facility].    If patient BMI > 45 do not schedule at [facility].    Are you taking methadone or Suboxone?  No    Have you had difficulties, pain, or discomfort during past endoscopy procedures?  No    Are you taking any prescription medications for pain 3 or more times per week?   NO, No RN review required.    Do you have a history of malignant hyperthermia?  No    (Females) Are you currently pregnant?   No     Have you been diagnosed or told you have pulmonary hypertension?   No    Do you have an LVAD?  No    Have you been told you have moderate to severe sleep apnea?  No    Have you been told you have COPD, asthma, or any other lung disease?  No    Do you have any heart conditions?  No     Have you ever had or are you waiting for an organ transplant?  No. Continue scheduling, no site restrictions.    Have you had a stroke or transient ischemic attack (TIA aka \"mini stroke\" in the last 6 months?   No    Have you been diagnosed with or been told you have cirrhosis of the liver?   No    Are you currently on dialysis?   No    Do you need assistance transferring?   No    BMI: Estimated body mass index is 29.1 kg/m  as calculated from the following:    Height as of 6/22/23: 1.746 m (5' 8.75\").    Weight as of 4/5/24: 88.7 kg (195 lb 9.6 oz).     Is patients BMI > 40 and scheduling location " [facility]?  No    Do you take an injectable medication for weight loss or diabetes (excluding insulin)?  No    Do you take the medication Naltrexone?  No    Do you take blood thinners?  No       Prep   Are you currently on dialysis or do you have chronic kidney disease?  No    Do you have a diagnosis of diabetes?  No - PRE-DIABETIC     Do you have a diagnosis of cystic fibrosis (CF)?  No    On a regular basis do you go 3 -5 days between bowel movements?  No    BMI > 40?  No    Preferred Pharmacy:    [id number] - [facility] [id number] [place] [address] [address]  [address] [place] [address] 24  [facility] [address]  Phone: [phone] Fax: [phone]      Final Scheduling Details     Procedure scheduled  Colonoscopy    Surgeon:  AJITH    Date of procedure:  05/16/2024    Pre-OP / PAC:   No - Not required for this site.    Location  [facility] - [facility] - Per order.    Sedation   Moderate Sedation - Per order.      Patient Reminders:   You will receive a call from a Nurse to review instructions and health history.  This assessment must be completed prior to your procedure.  Failure to complete the Nurse assessment may result in the procedure being cancelled.      On the day of your procedure, please designate an adult(s) who can drive you home stay with you for the next 24 hours. The medicines used in the exam will make you sleepy. You will not be able to drive.      You cannot take public transportation, ride share services, or non-medical taxi service without a responsible caregiver.  Medical transport services are allowed with the requirement that a responsible caregiver will receive you at your destination.  We require that drivers and caregivers are confirmed prior to your procedure.

## 2024-05-10 NOTE — TELEPHONE ENCOUNTER
"Patient reports he takes Tramadol 50 mg 1/2 tablet \"a few times a week\".  Message sent to scoping provider / anes for review.  Patient understands will be reviewed and will get a call back ONLY if needing to r/s.      Staff message sent to scoping provider / anes for review      Pre assessment completed for upcoming procedure.      Procedure details:    Patient scheduled for Colonoscopy  on 5/16/24.     Arrival time: 1200. Procedure time 1245    Facility location: [facility]; [address]nd Floor, [place] [address]. Check in location: 2nd Floor at Surgery desk.    Sedation type: Conscious sedation     Pre op exam needed? N/A    Indication for procedure:     Positive Fit or Cologuard       Designated  policy reviewed. Instructed to have someone stay 6 hours post procedure.       Chart review:     Electronic implanted devices? No    Recent diagnosis of diverticulitis within the last 6 weeks?  No    Diabetic? No      Medication review:    Anticoagulants? No    NSAIDS? No NSAID medications per patient's medication list.  RN will verify with pre-assessment call.    Other medication HOLDING recommendations:  N/A      Prep for procedure:     Bowel prep recommendation: Extended Golytely. Bowel prep prescription sent to    [id number] [id number] [place] [address] [address]     Due to: chronic pain medication noted in chart.   Patient requests zofran      Prep instructions sent via [employer]     Reviewed procedure prep instructions.     Patient verbalized understanding and had no questions or concerns at this time.        Corinne Kliber, RN  Endoscopy Procedure Pre Assessment RN  [phone] option 4      "

## 2024-05-10 NOTE — TELEPHONE ENCOUNTER
I have seen pt first time recently and no needs of Endoscopy as per my understanding. He had some right abdominal pain and plan was to check his LFTs before planning Ultrasound. But LFTs were normal so we did not plan for it. If he is still continuing to have Rt abdominal pain.  I went ahead and placed referral to GI consult as requested which he can discuss with them whether he needs EGD.     Pt had positive Cologuard for which I already placed Colonoscopy orders as below. Please give him information of the referrals.     Thank you  Stefani Huynh MD on 5/10/2024

## 2024-05-13 NOTE — TELEPHONE ENCOUNTER
Renny Montejo MD Kandagatla, Pridvi G, MD  Cc: Kliber, Corinne, RN  I m ok with CS if the endoscopist is.    Kandagatla, Pridvi G, MD Kliber, Corinne, HITESH  Hi,    If possible, MAC may be a better option.    Zaida    MAC preferred per scoping provider.  Staff message sent to scheduling for review.    Corinne Kliber, RN  Endoscopy Procedure Pre Assessment RN  [phone] option 4

## 2024-05-14 ENCOUNTER — ANESTHESIA EVENT (OUTPATIENT)
Dept: SURGERY | Facility: AMBULATORY SURGERY CENTER | Age: 49
End: 2024-05-14
Payer: COMMERCIAL

## 2024-05-14 NOTE — TELEPHONE ENCOUNTER
UPDATE:    Scheduling able to secure MAC for patient on same day and time.    PA completed below.  Discussed different sedation options with patient during PA, patient verbalized understanding of both sedation types and agreed to either sedation scoping provider/anes recommended.    Corinne Kliber, RN  Endoscopy Procedure Pre Assessment RN  [phone] option 4

## 2024-05-16 ENCOUNTER — HOSPITAL ENCOUNTER (OUTPATIENT)
Facility: AMBULATORY SURGERY CENTER | Age: 49
Discharge: HOME | End: 2024-05-16
Attending: STUDENT IN AN ORGANIZED HEALTH CARE EDUCATION/TRAINING PROGRAM | Admitting: STUDENT IN AN ORGANIZED HEALTH CARE EDUCATION/TRAINING PROGRAM
Payer: COMMERCIAL

## 2024-05-16 ENCOUNTER — ANESTHESIA (OUTPATIENT)
Dept: SURGERY | Facility: AMBULATORY SURGERY CENTER | Age: 49
End: 2024-05-16
Payer: COMMERCIAL

## 2024-05-16 VITALS
DIASTOLIC BLOOD PRESSURE: 86 MMHG | TEMPERATURE: 97.1 F | RESPIRATION RATE: 18 BRPM | SYSTOLIC BLOOD PRESSURE: 123 MMHG | OXYGEN SATURATION: 99 %

## 2024-05-16 VITALS — HEART RATE: 75 BPM

## 2024-05-16 LAB — COLONOSCOPY: NORMAL

## 2024-05-16 PROCEDURE — 45378 DIAGNOSTIC COLONOSCOPY: CPT | Performed by: ANESTHESIOLOGY

## 2024-05-16 PROCEDURE — G8907 PT DOC NO EVENTS ON DISCHARG: HCPCS

## 2024-05-16 PROCEDURE — 45378 DIAGNOSTIC COLONOSCOPY: CPT | Performed by: NURSE ANESTHETIST, CERTIFIED REGISTERED

## 2024-05-16 PROCEDURE — G8918 PT W/O PREOP ORDER IV AB PRO: HCPCS

## 2024-05-16 PROCEDURE — 45378 DIAGNOSTIC COLONOSCOPY: CPT

## 2024-05-16 RX ORDER — PROPOFOL 10 MG/ML
INJECTION, EMULSION INTRAVENOUS CONTINUOUS PRN
Status: DISCONTINUED | OUTPATIENT
Start: 2024-05-16 | End: 2024-05-16

## 2024-05-16 RX ORDER — LIDOCAINE HYDROCHLORIDE 20 MG/ML
INJECTION, SOLUTION INFILTRATION; PERINEURAL PRN
Status: DISCONTINUED | OUTPATIENT
Start: 2024-05-16 | End: 2024-05-16

## 2024-05-16 RX ORDER — SODIUM CHLORIDE, SODIUM LACTATE, POTASSIUM CHLORIDE, CALCIUM CHLORIDE 600; 310; 30; 20 MG/100ML; MG/100ML; MG/100ML; MG/100ML
INJECTION, SOLUTION INTRAVENOUS CONTINUOUS PRN
Status: DISCONTINUED | OUTPATIENT
Start: 2024-05-16 | End: 2024-05-16

## 2024-05-16 RX ADMIN — PROPOFOL 70 MG: 10 INJECTION, EMULSION INTRAVENOUS at 13:05

## 2024-05-16 RX ADMIN — PROPOFOL 200 MCG/KG/MIN: 10 INJECTION, EMULSION INTRAVENOUS at 13:09

## 2024-05-16 RX ADMIN — LIDOCAINE HYDROCHLORIDE 60 MG: 20 INJECTION, SOLUTION INFILTRATION; PERINEURAL at 13:05

## 2024-05-16 RX ADMIN — PROPOFOL 30 MG: 10 INJECTION, EMULSION INTRAVENOUS at 13:07

## 2024-05-16 RX ADMIN — SODIUM CHLORIDE, SODIUM LACTATE, POTASSIUM CHLORIDE, CALCIUM CHLORIDE: 600; 310; 30; 20 INJECTION, SOLUTION INTRAVENOUS at 13:00

## 2024-05-16 NOTE — H&P
Pre-Endoscopy History and Physical     Mildred Quintana MRN# [id number]   YOB: 1975 Age: 48 year old     Date of Procedure: 05/16/24  Primary care provider: Edvin Reich  Type of Endoscopy: Colonoscopy  Reason for Procedure: Screening  Type of Anesthesia Anticipated: MAC Sedation    HPI:    Mildred is a 48 year old male who will be undergoing the above procedure.      A history and physical has been performed. The patient's medications and allergies have been reviewed. The risks and benefits of the procedure and the sedation options and risks were discussed with the patient.  All questions were answered and informed consent was obtained.      He denies any irregular bowel movements, blood in the stool, or changes to her bowel habits.      Current Outpatient Medications   Medication Sig Dispense Refill    blood glucose (NO BRAND SPECIFIED) lancets standard Use to test blood sugar as directed. 100 each 1    blood glucose (NO BRAND SPECIFIED) test strip Use to test blood sugar as directed. 100 strip 1    blood glucose monitoring (NO BRAND SPECIFIED) meter device kit Use to test blood sugar as directed. 1 kit 0    carbamide peroxide (DEBROX) 6.5 % otic solution Place 5 drops into both ears 2 times daily 15 mL 1    meloxicam (MOBIC) 15 MG tablet Take 15 mg by mouth daily (Patient not taking: Reported on 4/5/2024)      methocarbamol (ROBAXIN) 500 MG tablet Take 1 tablet (500 mg) by mouth 2 times daily as needed for muscle spasms 30 tablet 1    ondansetron (ZOFRAN) 4 MG tablet Take one tablet every six hours for nausea during colonoscopy bowel prepping 3 tablet 0    traMADol (ULTRAM) 50 MG tablet Take 1 tablet (50 mg) by mouth every 6 hours as needed for severe pain 15 tablet 0    triamcinolone (KENALOG) 0.1 % external cream Apply topically 2 times daily to affected area(s)       No current facility-administered medications for this encounter.       Patient Active Problem List   Diagnosis    Cervical  "myelopathy (H)    Thyroid nodule    S/P cervical spinal fusion in 7/2023 at [facility]    Upper back pain    Back muscle spasm        Past Medical History:   Diagnosis Date    Prediabetes         Past Surgical History:   Procedure Laterality Date    NO HISTORY OF SURGERY         Social History     Tobacco Use    Smoking status: Some Days     Types: Cigarettes, Cigars, cigarillos or filtered cigars    Smokeless tobacco: Never   Substance Use Topics    Alcohol use: Not Currently       Family History   Problem Relation Age of Onset    Diabetes Mother     Hypertension Father        REVIEW OF SYSTEMS:     5 point ROS negative except as noted above in HPI, including Gen., Resp., CV, GI &  system review.      PHYSICAL EXAM:   BP (!) 146/87   Temp 97.9  F (36.6  C) (Temporal)   Resp 18   SpO2 96%  Estimated body mass index is 29.1 kg/m  as calculated from the following:    Height as of 6/22/23: 1.746 m (5' 8.75\").    Weight as of 4/5/24: 88.7 kg (195 lb 9.6 oz).   All Vitals have been reviewed.    GENERAL APPEARANCE: healthy and alert  MENTAL STATUS: alert  RESP: comfortable on room air, no audible wheezing  CV: regular rates and rhythm      IMPRESSION   Adapablo is a 48 year old male who is here for a colonoscopy.        PLAN:     Plan for colonoscopy. We discussed the risks, benefits and alternatives and the patient wished to proceed.        ZORAIDA CANSECO MD  [facility]  Phone: [phone]  Fax: [phone]  May 16, 2024    "

## 2024-05-16 NOTE — DISCHARGE INSTRUCTIONS
Post-Colonoscopy Discharge Instructions:    1. You have just had an examination of your colon (large intestine) called a colonoscopy. You should have a full report of the findings to take with you today. It will list if any polyps were removed or if any biopsies were taken to send to the laboratory. If we did take out polyps or do biopsies, you should get a letter in the next 1-2 weeks if they are the standard pre-cancerous polyps. If it is anything more serious, your doctor will call you. The timing of your next colonoscopy is determined by your family history of polyps/colon cancer and what findings were on your colonoscopy. [facility] will keep track of when you are due for your next colonoscopy and contact you.     2. No driving or operating heavy power equipment today.    3. Do not drink alcohol for 12 hours after the procedure.     4. Do not sign any important or legally binding papers today.    5. No strenuous activity today (its a great day for a nap and to lay on the couch and watch some TV!).     6. You can eat whatever you like after your procedure.     7. You may experience drowsiness or forgetfulness at first. You may also notice altered  bowel habits, excessive gas and belching or bloated feeling.    8. If recommended by your physician, you should avoid Aspirin, Aspirin products and   Arthritis medications for 7 - 10 days following the exam if biopsies or polypectomies   have been performed.    9. You may develop soreness or redness where your IV medication was given.    Apply warm wet cloths to the area for 15 minutes 3 - 4 times per day.    10. You may have a bloated, gaseous feeling in your abdomen after a  Colonoscopy for the next few hours. Passing gas and belching will help. Walking or lying down on your left side with your knees flexed may relieve the discomfort.    11. Call the office at [phone] right away if you notice any of the following:  a. Vomiting of blood  and/or  coffee ground  material.  b. Rectal bleeding - >1Tbsp, blood clots, or continuous bleeding.  c. Severe abdominal pain.  d. Chills, fever over 101 degrees F.  e. Persistent nausea or vomiting.  f. Persistent disorientation/confusion.  g. Persistent coughing or spitting up blood.  h. Persistent redness, hardness, or tenderness at IV site.

## 2024-05-16 NOTE — ANESTHESIA CARE TRANSFER NOTE
Patient: Mildred Quinatna    Procedure: Procedure(s):  Colonoscopy with CO2 insufflation       Diagnosis: Positive colorectal cancer screening using Cologuard test [R19.5]  Diagnosis Additional Information: No value filed.    Anesthesia Type:   No value filed.     Note:    Oropharynx: oropharynx clear of all foreign objects  Level of Consciousness: drowsy  Oxygen Supplementation: room air    Independent Airway: airway patency satisfactory and stable    Vital Signs Stable: post-procedure vital signs reviewed and stable  Report to RN Given: handoff report given  Patient transferred to: Phase II  Comments: Anesthesia Care Transfer Note    Patient: Mildred Quintana    Transferred to: Phase II    Patient vital signs: stable    Airway: none    Monitors on, breathing spontaneously, report to RN    Drea Bonilla CRNA  5/16/2024 1:25 PM         Handoff Report: Identifed the Patient, Identified the Reponsible Provider, Reviewed the pertinent medical history, Discussed the surgical course, Reviewed Intra-OP anesthesia mangement and issues during anesthesia, Set expectations for post-procedure period and Allowed opportunity for questions and acknowledgement of understanding      Vitals:  Vitals Value Taken Time   BP     Temp     Pulse     Resp     SpO2         Electronically Signed By: BRITT Marques CRNA  May 16, 2024  1:25 PM

## 2024-05-17 ASSESSMENT — LIFESTYLE VARIABLES: TOBACCO_USE: 1

## 2024-05-17 NOTE — ANESTHESIA POSTPROCEDURE EVALUATION
Patient: Mildred Quintana    Procedure: Procedure(s):  Colonoscopy with CO2 insufflation       Anesthesia Type:  MAC    Note:  Disposition: Outpatient   Postop Pain Control: Uneventful            Sign Out: Well controlled pain   PONV: No   Neuro/Psych: Uneventful            Sign Out: Acceptable/Baseline neuro status   Airway/Respiratory: Uneventful            Sign Out: Acceptable/Baseline resp. status   CV/Hemodynamics: Uneventful            Sign Out: Acceptable CV status; No obvious hypovolemia; No obvious fluid overload   Other NRE: NONE   DID A NON-ROUTINE EVENT OCCUR? No       Last vitals:  Vitals Value Taken Time   /86 05/16/24 1400   Temp 36.2  C (97.1  F) 05/16/24 1400   Pulse     Resp 18 05/16/24 1400   SpO2 99 % 05/16/24 1400       Electronically Signed By: Geoff Russell MD  May 17, 2024  4:44 PM

## 2024-05-17 NOTE — ANESTHESIA PREPROCEDURE EVALUATION
Anesthesia Pre-Procedure Evaluation    Patient: Mildred Quintana   MRN: [id number] : 1975        Procedure : Procedure(s):  Colonoscopy with CO2 insufflation          Past Medical History:   Diagnosis Date     Prediabetes       Past Surgical History:   Procedure Laterality Date     COLONOSCOPY WITH CO2 INSUFFLATION N/A 2024    Procedure: Colonoscopy with CO2 insufflation;  Surgeon: Zaida Leahy MD;  Location: [facility] OR     NO HISTORY OF SURGERY        Allergies   Allergen Reactions     No Known Allergies       Social History     Tobacco Use     Smoking status: Some Days     Types: Cigarettes, Cigars, cigarillos or filtered cigars     Smokeless tobacco: Never   Substance Use Topics     Alcohol use: Not Currently      Wt Readings from Last 1 Encounters:   24 88.7 kg (195 lb 9.6 oz)        Anesthesia Evaluation   Pt has had prior anesthetic. Type: General.    No history of anesthetic complications       ROS/MED HX  ENT/Pulmonary:     (+)                tobacco use, Current use,                       Neurologic: Comment: Cervical myelopathy.  S/P fusion with some residual weakness on the right side with pain on the left side.    (+)    peripheral neuropathy,                            Cardiovascular:  - neg cardiovascular ROS     METS/Exercise Tolerance:     Hematologic:       Musculoskeletal:       GI/Hepatic:  - neg GI/hepatic ROS     Renal/Genitourinary:  - neg Renal ROS     Endo:  - neg endo ROS   (+)          thyroid problem, Nodule,           Psychiatric/Substance Use:  - neg psychiatric ROS     Infectious Disease:  - neg infectious disease ROS     Malignancy:  - neg malignancy ROS     Other:          Physical Exam    Airway  airway exam normal           Respiratory Devices and Support         Dental       (+) Completely normal teeth      Cardiovascular   cardiovascular exam normal          Pulmonary   pulmonary exam normal            OUTSIDE LABS:  CBC:   Lab Results   Component Value Date  "   WBC 4.5 04/05/2024    WBC 4.3 06/22/2023    HGB 15.7 04/05/2024    HGB 14.7 06/22/2023    HCT 48.4 04/05/2024    HCT 45.9 06/22/2023     (L) 04/05/2024     06/22/2023     BMP:   Lab Results   Component Value Date     04/05/2024     06/22/2023    POTASSIUM 4.5 04/05/2024    POTASSIUM 4.8 06/22/2023    CHLORIDE 101 04/05/2024    CHLORIDE 101 06/22/2023    CO2 22 04/05/2024    CO2 27 06/22/2023    BUN 11.3 04/05/2024    BUN 13.9 06/22/2023    CR 1.01 04/05/2024    CR 1.07 06/22/2023     (H) 04/05/2024     (H) 06/22/2023     COAGS: No results found for: \"PTT\", \"INR\", \"FIBR\"  POC: No results found for: \"BGM\", \"HCG\", \"HCGS\"  HEPATIC:   Lab Results   Component Value Date    ALBUMIN 4.4 04/05/2024    PROTTOTAL 7.3 04/05/2024    ALT 15 04/05/2024    AST 22 04/05/2024    ALKPHOS 63 04/05/2024    BILITOTAL 0.6 04/05/2024     OTHER:   Lab Results   Component Value Date    A1C 5.7 (H) 04/05/2024    MIROSLAVA 9.5 04/05/2024    TSH 0.55 04/25/2023       Anesthesia Plan    ASA Status:  2    NPO Status:  NPO Appropriate    Anesthesia Type: MAC.     - Reason for MAC: straight local not clinically adequate   Induction: Intravenous, Propofol.   Maintenance: TIVA.        Consents    Anesthesia Plan(s) and associated risks, benefits, and realistic alternatives discussed. Questions answered and patient/representative(s) expressed understanding.     - Discussed:     - Discussed with:  Patient      - Extended Intubation/Ventilatory Support Discussed: No.      - Patient is DNR/DNI Status: No     Use of blood products discussed: No .     Postoperative Care    Post procedure pain management: None required.   PONV prophylaxis: Ondansetron (or other 5HT-3), Background Propofol Infusion     Comments:             Geoff Russell MD    I have reviewed the pertinent notes and labs in the chart from the past 30 days and (re)examined the patient.  Any updates or changes from those notes are reflected in this note.     "

## 2024-06-14 PROBLEM — M54.9 UPPER BACK PAIN: Status: RESOLVED | Noted: 2024-04-18 | Resolved: 2024-06-14

## 2024-06-14 PROBLEM — M62.830 BACK MUSCLE SPASM: Status: RESOLVED | Noted: 2024-04-18 | Resolved: 2024-06-14

## 2024-10-30 ENCOUNTER — OFFICE VISIT (OUTPATIENT)
Dept: UROLOGY | Facility: CLINIC | Age: 49
End: 2024-10-30
Payer: COMMERCIAL

## 2024-10-30 VITALS — DIASTOLIC BLOOD PRESSURE: 78 MMHG | HEART RATE: 82 BPM | SYSTOLIC BLOOD PRESSURE: 133 MMHG | WEIGHT: 187.81 LBS

## 2024-10-30 DIAGNOSIS — R31.0 GROSS HEMATURIA: Primary | ICD-10-CM

## 2024-10-30 PROBLEM — E04.1 THYROID NODULE: Status: ACTIVE | Noted: 2023-04-14

## 2024-10-30 PROBLEM — Z98.1 S/P CERVICAL SPINAL FUSION: Status: ACTIVE | Noted: 2024-04-05

## 2024-10-30 PROBLEM — G95.9 CERVICAL MYELOPATHY: Status: ACTIVE | Noted: 2023-04-14

## 2024-10-30 PROCEDURE — 99999 PR PBB SHADOW E&M-NEW PATIENT-LVL III: CPT | Mod: PBBFAC,,,

## 2024-10-30 PROCEDURE — 87086 URINE CULTURE/COLONY COUNT: CPT

## 2024-10-30 RX ORDER — METFORMIN HYDROCHLORIDE 500 MG/1
500 TABLET ORAL 2 TIMES DAILY WITH MEALS
COMMUNITY

## 2024-10-31 ENCOUNTER — TELEPHONE (OUTPATIENT)
Dept: UROLOGY | Facility: CLINIC | Age: 49
End: 2024-10-31
Payer: COMMERCIAL

## 2024-10-31 LAB — BACTERIA UR CULT: NORMAL

## 2024-11-01 ENCOUNTER — PATIENT MESSAGE (OUTPATIENT)
Dept: UROLOGY | Facility: CLINIC | Age: 49
End: 2024-11-01
Payer: COMMERCIAL

## 2025-05-17 ENCOUNTER — HEALTH MAINTENANCE LETTER (OUTPATIENT)
Age: 50
End: 2025-05-17

## (undated) DEVICE — KIT ENDO FIRST STEP DISINFECTANT 200ML W/POUCH EP-4

## (undated) DEVICE — PREP CHLORAPREP 26ML TINTED ORANGE  260815

## (undated) DEVICE — PAD CHUX UNDERPAD 23X24" 7136